# Patient Record
Sex: MALE | Race: WHITE | NOT HISPANIC OR LATINO | Employment: OTHER | URBAN - METROPOLITAN AREA
[De-identification: names, ages, dates, MRNs, and addresses within clinical notes are randomized per-mention and may not be internally consistent; named-entity substitution may affect disease eponyms.]

---

## 2023-08-30 ENCOUNTER — OFFICE VISIT (OUTPATIENT)
Dept: FAMILY MEDICINE CLINIC | Facility: CLINIC | Age: 63
End: 2023-08-30
Payer: COMMERCIAL

## 2023-08-30 VITALS
DIASTOLIC BLOOD PRESSURE: 84 MMHG | BODY MASS INDEX: 34.59 KG/M2 | OXYGEN SATURATION: 95 % | SYSTOLIC BLOOD PRESSURE: 138 MMHG | HEIGHT: 73 IN | TEMPERATURE: 96.4 F | WEIGHT: 261 LBS | HEART RATE: 106 BPM

## 2023-08-30 DIAGNOSIS — G47.33 OSA (OBSTRUCTIVE SLEEP APNEA): ICD-10-CM

## 2023-08-30 DIAGNOSIS — Z87.891 FORMER CIGARETTE SMOKER: ICD-10-CM

## 2023-08-30 DIAGNOSIS — Z11.59 NEED FOR HEPATITIS C SCREENING TEST: ICD-10-CM

## 2023-08-30 DIAGNOSIS — E78.1 HYPERTRIGLYCERIDEMIA: ICD-10-CM

## 2023-08-30 DIAGNOSIS — E11.9 TYPE 2 DIABETES MELLITUS WITHOUT COMPLICATION, WITH LONG-TERM CURRENT USE OF INSULIN: ICD-10-CM

## 2023-08-30 DIAGNOSIS — M25.561 CHRONIC PAIN OF RIGHT KNEE: ICD-10-CM

## 2023-08-30 DIAGNOSIS — E11.9 COMPREHENSIVE DIABETIC FOOT EXAMINATION, TYPE 2 DM, ENCOUNTER FOR: ICD-10-CM

## 2023-08-30 DIAGNOSIS — Z76.89 ENCOUNTER TO ESTABLISH CARE: Primary | ICD-10-CM

## 2023-08-30 DIAGNOSIS — Z79.4 TYPE 2 DIABETES MELLITUS WITHOUT COMPLICATION, WITH LONG-TERM CURRENT USE OF INSULIN: ICD-10-CM

## 2023-08-30 DIAGNOSIS — R35.1 NOCTURIA: ICD-10-CM

## 2023-08-30 DIAGNOSIS — G89.29 CHRONIC PAIN OF RIGHT KNEE: ICD-10-CM

## 2023-08-30 DIAGNOSIS — I10 HTN (HYPERTENSION), BENIGN: ICD-10-CM

## 2023-08-30 DIAGNOSIS — E66.9 CLASS 1 OBESITY WITH SERIOUS COMORBIDITY AND BODY MASS INDEX (BMI) OF 34.0 TO 34.9 IN ADULT, UNSPECIFIED OBESITY TYPE: ICD-10-CM

## 2023-08-30 DIAGNOSIS — G47.09 OTHER INSOMNIA: ICD-10-CM

## 2023-08-30 PROBLEM — H90.3 BILATERAL SENSORINEURAL HEARING LOSS: Status: ACTIVE | Noted: 2022-12-16

## 2023-08-30 RX ORDER — TAMSULOSIN HYDROCHLORIDE 0.4 MG/1
0.4 CAPSULE ORAL DAILY
COMMUNITY
Start: 2023-06-14 | End: 2023-08-30 | Stop reason: SDUPTHER

## 2023-08-30 RX ORDER — BUPROPION HYDROCHLORIDE 150 MG/1
150 TABLET, EXTENDED RELEASE ORAL 2 TIMES DAILY
Qty: 180 TABLET | Refills: 1 | Status: SHIPPED | OUTPATIENT
Start: 2023-08-30

## 2023-08-30 RX ORDER — TRAZODONE HYDROCHLORIDE 100 MG/1
100 TABLET ORAL
Qty: 90 TABLET | Refills: 1 | Status: SHIPPED | OUTPATIENT
Start: 2023-08-30

## 2023-08-30 RX ORDER — INSULIN DEGLUDEC 200 U/ML
130 INJECTION, SOLUTION SUBCUTANEOUS NIGHTLY
Qty: 60 ML | Refills: 1 | Status: SHIPPED | OUTPATIENT
Start: 2023-08-30

## 2023-08-30 RX ORDER — PEN NEEDLE, DIABETIC 32GX 5/32"
NEEDLE, DISPOSABLE MISCELLANEOUS
COMMUNITY
Start: 2023-06-29 | End: 2023-08-30 | Stop reason: SDUPTHER

## 2023-08-30 RX ORDER — AMLODIPINE BESYLATE 5 MG/1
5 TABLET ORAL DAILY
Qty: 90 TABLET | Refills: 1 | Status: SHIPPED | OUTPATIENT
Start: 2023-08-30

## 2023-08-30 RX ORDER — AMLODIPINE BESYLATE 5 MG/1
1 TABLET ORAL DAILY
COMMUNITY
Start: 2023-06-19 | End: 2023-08-30 | Stop reason: SDUPTHER

## 2023-08-30 RX ORDER — BUPROPION HYDROCHLORIDE 150 MG/1
1 TABLET, EXTENDED RELEASE ORAL 2 TIMES DAILY
COMMUNITY
Start: 2023-07-05 | End: 2023-08-30 | Stop reason: SDUPTHER

## 2023-08-30 RX ORDER — PEN NEEDLE, DIABETIC 32GX 5/32"
1 NEEDLE, DISPOSABLE MISCELLANEOUS NIGHTLY
Qty: 100 EACH | Refills: 3 | Status: SHIPPED | OUTPATIENT
Start: 2023-08-30

## 2023-08-30 RX ORDER — INSULIN DEGLUDEC 200 U/ML
INJECTION, SOLUTION SUBCUTANEOUS
COMMUNITY
End: 2023-08-30 | Stop reason: SDUPTHER

## 2023-08-30 RX ORDER — EMPAGLIFLOZIN AND LINAGLIPTIN 25; 5 MG/1; MG/1
1 TABLET, FILM COATED ORAL DAILY
Qty: 90 TABLET | Refills: 1 | Status: SHIPPED | OUTPATIENT
Start: 2023-08-30

## 2023-08-30 RX ORDER — EMPAGLIFLOZIN AND LINAGLIPTIN 25; 5 MG/1; MG/1
1 TABLET, FILM COATED ORAL DAILY
COMMUNITY
Start: 2023-07-07 | End: 2023-08-30 | Stop reason: SDUPTHER

## 2023-08-30 RX ORDER — LISINOPRIL 40 MG/1
40 TABLET ORAL DAILY
COMMUNITY
Start: 2023-07-05 | End: 2023-08-30 | Stop reason: SDUPTHER

## 2023-08-30 RX ORDER — TRAZODONE HYDROCHLORIDE 100 MG/1
100 TABLET ORAL
COMMUNITY
Start: 2023-05-12 | End: 2023-08-30 | Stop reason: SDUPTHER

## 2023-08-30 RX ORDER — FENOFIBRATE 145 MG/1
145 TABLET, COATED ORAL DAILY
Qty: 90 TABLET | Refills: 1 | Status: SHIPPED | OUTPATIENT
Start: 2023-08-30

## 2023-08-30 RX ORDER — IBUPROFEN 600 MG/1
600 TABLET ORAL EVERY 12 HOURS PRN
Qty: 180 TABLET | Refills: 1 | Status: SHIPPED | OUTPATIENT
Start: 2023-08-30

## 2023-08-30 RX ORDER — IBUPROFEN 600 MG/1
1 TABLET ORAL EVERY 8 HOURS
COMMUNITY
Start: 2023-04-11 | End: 2023-08-30 | Stop reason: SDUPTHER

## 2023-08-30 RX ORDER — INSULIN GLARGINE 100 [IU]/ML
INJECTION, SOLUTION SUBCUTANEOUS
COMMUNITY
Start: 2023-06-19 | End: 2023-08-30 | Stop reason: CLARIF

## 2023-08-30 RX ORDER — FENOFIBRATE 145 MG/1
145 TABLET, COATED ORAL DAILY
COMMUNITY
Start: 2023-07-05 | End: 2023-08-30 | Stop reason: SDUPTHER

## 2023-08-30 RX ORDER — TAMSULOSIN HYDROCHLORIDE 0.4 MG/1
0.4 CAPSULE ORAL EVERY EVENING
Qty: 90 CAPSULE | Refills: 1 | Status: SHIPPED | OUTPATIENT
Start: 2023-08-30

## 2023-08-30 RX ORDER — LISINOPRIL 40 MG/1
40 TABLET ORAL DAILY
Qty: 90 TABLET | Refills: 1 | Status: SHIPPED | OUTPATIENT
Start: 2023-08-30

## 2023-08-30 NOTE — PROGRESS NOTES
Chief Complaint  Establish Care (NEW Patient.  Est care )    Subjective            Johnie Salter presents to [facility]  History of Present Illness  Patient is here today to establish care with our practice as a new patient    And then also will need labs and medication refills for the chronic comorbid conditions normally managed from the primary care standpoint and there are records in the system [place] that did his prior chronic comorbid management patient will stop back by fasting    --Diabetes type 2: Tolerating medication well with no side effects no issues reported no overt hypoglycemic episodes and reported as overall stable and his last hemoglobin A1c was in the 6% range    --Hypertension: Tolerates medication well with no side effects no issues reported no chest pain or shortness of breath syncopal episodes dizziness or lightheadedness and overall stable    --Dyslipidemia: Tolerates medication well with no side effects no issues reported no myalgias reported overall stable    --Obstructive sleep apnea on CPAP with insomnia: Tolerating medication very well with no side effects no issues reported no SI/HI and overall stable    --Nocturia: Tolerating the Flomax very well with no side effects no issues reported overall stable    --Chronic pain right knee: Tolerating medication well with no side effects no issues reported and uses a sleeve/brace for the right knee and has been seeing orthopedics as well    PHQ-2 Total Score: 0  PHQ-9 Total Score: 0    Past Medical History:   Diagnosis Date    Cataract     Colon polyp     Diabetes mellitus     HL (hearing loss)     Hyperlipidemia     Hypertension        No Known Allergies     Past Surgical History:   Procedure Laterality Date    CLAVICLE SURGERY Right         Social History     Tobacco Use    Smoking status: Former     Packs/day: 1.00     Years: 45.00     Pack years: 45.00     Types: Cigarettes      Quit date:      Years since quittin.6     Passive exposure: Past    Smokeless tobacco: Never   Vaping Use    Vaping Use: Never used   Substance Use Topics    Alcohol use: Not Currently    Drug use: Not Currently     Types: Marijuana       Family History   Problem Relation Age of Onset    Heart disease Mother     Cancer Mother     Hyperlipidemia Father     Diabetes Father     COPD Father     Hypertension Father     Other Brother         Health Maintenance Due   Topic Date Due    BMI FOLLOWUP  Never done    HEPATITIS C SCREENING  Never done    ANNUAL PHYSICAL  Never done    HEMOGLOBIN A1C  2023    DIABETIC EYE EXAM  Never done        Current Outpatient Medications on File Prior to Visit   Medication Sig    [DISCONTINUED] amLODIPine (NORVASC) 5 MG tablet Take 1 tablet by mouth Daily.    [DISCONTINUED] BD Pen Needle Mony U/F 32G X 4 MM misc USE NIGHTLY AS DIRECTED    [DISCONTINUED] buPROPion SR (WELLBUTRIN SR) 150 MG 12 hr tablet Take 1 tablet by mouth 2 (Two) Times a Day.    [DISCONTINUED] fenofibrate (TRICOR) 145 MG tablet Take 1 tablet by mouth Daily.    [DISCONTINUED] Glyxambi 25-5 MG tablet Take 1 tablet by mouth Daily.    [DISCONTINUED] ibuprofen (ADVIL,MOTRIN) 600 MG tablet Take 1 tablet by mouth Every 8 (Eight) Hours.    [DISCONTINUED] lisinopril (PRINIVIL,ZESTRIL) 40 MG tablet Take 1 tablet by mouth Daily.    [DISCONTINUED] metFORMIN (GLUCOPHAGE) 1000 MG tablet Take 1 tablet by mouth.    [DISCONTINUED] tamsulosin (FLOMAX) 0.4 MG capsule 24 hr capsule Take 1 capsule by mouth Daily.    [DISCONTINUED] traZODone (DESYREL) 100 MG tablet Take 1 tablet by mouth every night at bedtime.    [DISCONTINUED] Tresiba FlexTouch 200 UNIT/ML solution pen-injector pen injection INJECT 130 UNITS BY SUBCUTANEOUS ROUTE NIGHTLY.    [DISCONTINUED] Insulin Glargine (BASAGLAR KWIKPEN) 100 UNIT/ML injection pen INJECT 130 UNITS UNDER THE SKIN EVERY EVENING. E11.65     No current facility-administered medications on file  "prior to visit.       Immunization History   Administered Date(s) Administered    COVID-19 (PFIZER) Purple Cap Monovalent 03/08/2021, 03/29/2021, 01/13/2022    Fluzone >6mos 10/20/2014, 09/10/2020, 10/04/2020, 10/27/2021, 10/26/2022    Influenza Seasonal Injectable 11/06/2016, 10/04/2020    Pneumococcal Conjugate 20-Valent (PCV20) 04/26/2023    Pneumococcal Polysaccharide (PPSV23) 08/13/2016, 10/24/2016    Shingrix 09/10/2019, 01/15/2020    Tdap 04/08/2020       Review of Systems   Constitutional:  Negative for fatigue.   HENT:  Negative for trouble swallowing.    Eyes:  Negative for blurred vision and double vision.   Respiratory:  Negative for choking and shortness of breath.    Cardiovascular:  Negative for chest pain.   Gastrointestinal:  Negative for abdominal pain and blood in stool.   Endocrine: Negative for polydipsia, polyphagia and polyuria.   Genitourinary:  Positive for frequency.   Musculoskeletal:  Positive for arthralgias.        Right knee    Skin:  Negative for rash and wound.   Neurological:  Negative for dizziness, seizures, syncope and light-headedness.   Hematological:  Does not bruise/bleed easily.   Psychiatric/Behavioral: Negative.        Objective     /84 (BP Location: Right arm, Patient Position: Sitting, Cuff Size: Adult)   Pulse 106   Temp 96.4 øF (35.8 øC) (Temporal)   Ht 184.2 cm (72.5\")   Wt 118 kg (261 lb)   SpO2 95%   BMI 34.91 kg/mý       Physical Exam  Vitals and nursing note reviewed.   Constitutional:       Appearance: Normal appearance. He is obese.   HENT:      Head: Normocephalic.      Right Ear: External ear normal.      Left Ear: External ear normal.      Nose: Nose normal.      Mouth/Throat:      Mouth: Mucous membranes are moist.   Eyes:      Pupils: Pupils are equal, round, and reactive to light.      Comments: Glasses    Cardiovascular:      Rate and Rhythm: Normal rate and regular rhythm.      Pulses:           Dorsalis pedis pulses are 2+ on the right side " and 2+ on the left side.        Posterior tibial pulses are 2+ on the right side and 2+ on the left side.      Heart sounds: Normal heart sounds.   Pulmonary:      Effort: Pulmonary effort is normal.      Breath sounds: Normal breath sounds.   Abdominal:      Palpations: Abdomen is soft.      Tenderness: There is no abdominal tenderness.   Musculoskeletal:         General: Normal range of motion.      Cervical back: Normal range of motion and neck supple.      Right foot: No deformity or bunion.      Left foot: No deformity or bunion.        Feet:       Comments: Wearing a knee brace    Feet:      Right foot:      Protective Sensation: 10 sites tested.  7 sites sensed.      Skin integrity: Skin integrity normal. No ulcer or blister.      Toenail Condition: Right toenails are normal. Right toenails are long.      Left foot:      Protective Sensation: 10 sites tested.  7 sites sensed.      Skin integrity: Skin integrity normal. No ulcer or blister.      Toenail Condition: Left toenails are normal. Left toenails are long.      Comments: Diabetic Foot Exam Performed and Monofilament Test Performed--from the line and below (+) good sensation      Skin:     General: Skin is warm and dry.   Neurological:      Mental Status: He is alert and oriented to person, place, and time.   Psychiatric:         Mood and Affect: Mood normal.         Behavior: Behavior normal.         Thought Content: Thought content normal.         Judgment: Judgment normal.       Result Review :                      CT Chest Low Dose Cancer Screening WO (09/04/2022 09:40)  Colonoscopy w Polypectomy (10/17/2022 08:57)  XR Knee 4+ View Right (02/03/2023 09:38)     Assessment and Plan      Diagnoses and all orders for this visit:    1. Encounter to establish care (Primary)    2. Type 2 diabetes mellitus without complication, with long-term current use of insulin  -     Glyxambi 25-5 MG tablet; Take 1 tablet by mouth Daily.  Dispense: 90 tablet; Refill:  1  -     metFORMIN (GLUCOPHAGE) 1000 MG tablet; Take 1 tablet by mouth 2 (Two) Times a Day With Meals.  Dispense: 180 tablet; Refill: 1  -     Tresiba FlexTouch 200 UNIT/ML solution pen-injector pen injection; Inject 130 Units under the skin into the appropriate area as directed Every Night.  Dispense: 60 mL; Refill: 1  -     BD Pen Needle Mony U/F 32G X 4 MM misc; Inject 1 each under the skin into the appropriate area as directed Every Night.  Dispense: 100 each; Refill: 3  -     Urinalysis With Culture If Indicated -  -     CBC & Differential  -     Comprehensive Metabolic Panel  -     Hemoglobin A1c  -     Lipid Panel  -     MicroAlbumin, Urine, Random - Urine, Clean Catch  -     TSH Rfx On Abnormal To Free T4    3. HTN (hypertension), benign  -     amLODIPine (NORVASC) 5 MG tablet; Take 1 tablet by mouth Daily.  Dispense: 90 tablet; Refill: 1  -     lisinopril (PRINIVIL,ZESTRIL) 40 MG tablet; Take 1 tablet by mouth Daily.  Dispense: 90 tablet; Refill: 1  -     Urinalysis With Culture If Indicated -  -     CBC & Differential  -     Comprehensive Metabolic Panel  -     Lipid Panel  -     MicroAlbumin, Urine, Random - Urine, Clean Catch  -     TSH Rfx On Abnormal To Free T4    4. Hypertriglyceridemia  -     fenofibrate (TRICOR) 145 MG tablet; Take 1 tablet by mouth Daily.  Dispense: 90 tablet; Refill: 1  -     Comprehensive Metabolic Panel  -     Lipid Panel    5. Chronic pain of right knee  -     ibuprofen (ADVIL,MOTRIN) 600 MG tablet; Take 1 tablet by mouth Every 12 (Twelve) Hours As Needed for Mild Pain or Moderate Pain.  Dispense: 180 tablet; Refill: 1    6. Nocturia  -     tamsulosin (FLOMAX) 0.4 MG capsule 24 hr capsule; Take 1 capsule by mouth Every Evening.  Dispense: 90 capsule; Refill: 1    7. Other insomnia  -     traZODone (DESYREL) 100 MG tablet; Take 1 tablet by mouth every night at bedtime.  Dispense: 90 tablet; Refill: 1    8. ROSA (obstructive sleep apnea)    9. Former cigarette smoker  -      buPROPion SR (WELLBUTRIN SR) 150 MG 12 hr tablet; Take 1 tablet by mouth 2 (Two) Times a Day.  Dispense: 180 tablet; Refill: 1    10. Comprehensive diabetic foot examination, type 2 DM, encounter for    11. Need for hepatitis C screening test  -     Hepatitis C Antibody    12. Class 1 obesity with serious comorbidity and body mass index (BMI) of 34.0 to 34.9 in adult, unspecified obesity type  Comments:  counseled diet and exercise            Follow Up     Return in about 9 weeks (around 11/1/2023), or if symptoms worsen or fail to improve, for welcome to medicare .    Patient was given instructions and counseling regarding his condition or for health maintenance advice. Please see specific information pulled into the AVS if appropriate.     Class 3 Severe Obesity (BMI >=40). Obesity-related health conditions include the following: hypertension, diabetes mellitus, and dyslipidemias. Obesity is improving with lifestyle modifications. BMI is  counseled diet and exercise  . We discussed portion control and increasing exercise.      Johnie Salter  reports that he quit smoking about 7 months ago. His smoking use included cigarettes. He has a 45.00 pack-year smoking history. He has been exposed to tobacco smoke. He has never used smokeless tobacco.

## 2023-08-31 ENCOUNTER — CLINICAL SUPPORT (OUTPATIENT)
Dept: FAMILY MEDICINE CLINIC | Facility: CLINIC | Age: 63
End: 2023-08-31
Payer: COMMERCIAL

## 2023-08-31 DIAGNOSIS — I10 HTN (HYPERTENSION), BENIGN: ICD-10-CM

## 2023-08-31 DIAGNOSIS — E78.1 HYPERTRIGLYCERIDEMIA: Primary | ICD-10-CM

## 2023-08-31 DIAGNOSIS — E11.9 TYPE 2 DIABETES MELLITUS WITHOUT COMPLICATION, WITH LONG-TERM CURRENT USE OF INSULIN: ICD-10-CM

## 2023-08-31 DIAGNOSIS — Z79.4 TYPE 2 DIABETES MELLITUS WITHOUT COMPLICATION, WITH LONG-TERM CURRENT USE OF INSULIN: ICD-10-CM

## 2023-08-31 LAB
ALBUMIN SERPL-MCNC: 4.6 G/DL (ref 3.5–5.2)
ALBUMIN UR-MCNC: 1.6 MG/DL
ALBUMIN/GLOB SERPL: 1.5 G/DL
ALP SERPL-CCNC: 49 U/L (ref 39–117)
ALT SERPL W P-5'-P-CCNC: 18 U/L (ref 1–41)
ANION GAP SERPL CALCULATED.3IONS-SCNC: 10.3 MMOL/L (ref 5–15)
AST SERPL-CCNC: 19 U/L (ref 1–40)
BASOPHILS # BLD AUTO: 0.03 10*3/MM3 (ref 0–0.2)
BASOPHILS NFR BLD AUTO: 0.3 % (ref 0–1.5)
BILIRUB SERPL-MCNC: 0.3 MG/DL (ref 0–1.2)
BILIRUB UR QL STRIP: NEGATIVE
BUN SERPL-MCNC: 15 MG/DL (ref 8–23)
BUN/CREAT SERPL: 15.3 (ref 7–25)
CALCIUM SPEC-SCNC: 9.6 MG/DL (ref 8.6–10.5)
CHLORIDE SERPL-SCNC: 102 MMOL/L (ref 98–107)
CHOLEST SERPL-MCNC: 147 MG/DL (ref 0–200)
CLARITY UR: CLEAR
CO2 SERPL-SCNC: 25.7 MMOL/L (ref 22–29)
COLOR UR: YELLOW
CREAT SERPL-MCNC: 0.98 MG/DL (ref 0.76–1.27)
DEPRECATED RDW RBC AUTO: 41.4 FL (ref 37–54)
EGFRCR SERPLBLD CKD-EPI 2021: 85.6 ML/MIN/1.73
EOSINOPHIL # BLD AUTO: 0.32 10*3/MM3 (ref 0–0.4)
EOSINOPHIL NFR BLD AUTO: 3.5 % (ref 0.3–6.2)
ERYTHROCYTE [DISTWIDTH] IN BLOOD BY AUTOMATED COUNT: 13.5 % (ref 12.3–15.4)
GLOBULIN UR ELPH-MCNC: 3 GM/DL
GLUCOSE SERPL-MCNC: 74 MG/DL (ref 65–99)
GLUCOSE UR STRIP-MCNC: ABNORMAL MG/DL
HBA1C MFR BLD: 5.7 % (ref 4.8–5.6)
HCT VFR BLD AUTO: 42.9 % (ref 37.5–51)
HCV AB SER DONR QL: NORMAL
HDLC SERPL-MCNC: 38 MG/DL (ref 40–60)
HGB BLD-MCNC: 14.1 G/DL (ref 13–17.7)
HGB UR QL STRIP.AUTO: NEGATIVE
IMM GRANULOCYTES # BLD AUTO: 0.03 10*3/MM3 (ref 0–0.05)
IMM GRANULOCYTES NFR BLD AUTO: 0.3 % (ref 0–0.5)
KETONES UR QL STRIP: NEGATIVE
LDLC SERPL CALC-MCNC: 88 MG/DL (ref 0–100)
LDLC/HDLC SERPL: 2.25 {RATIO}
LEUKOCYTE ESTERASE UR QL STRIP.AUTO: NEGATIVE
LYMPHOCYTES # BLD AUTO: 2.61 10*3/MM3 (ref 0.7–3.1)
LYMPHOCYTES NFR BLD AUTO: 28.6 % (ref 19.6–45.3)
MCH RBC QN AUTO: 27.5 PG (ref 26.6–33)
MCHC RBC AUTO-ENTMCNC: 32.9 G/DL (ref 31.5–35.7)
MCV RBC AUTO: 83.8 FL (ref 79–97)
MONOCYTES # BLD AUTO: 0.72 10*3/MM3 (ref 0.1–0.9)
MONOCYTES NFR BLD AUTO: 7.9 % (ref 5–12)
NEUTROPHILS NFR BLD AUTO: 5.42 10*3/MM3 (ref 1.7–7)
NEUTROPHILS NFR BLD AUTO: 59.4 % (ref 42.7–76)
NITRITE UR QL STRIP: NEGATIVE
NRBC BLD AUTO-RTO: 0 /100 WBC (ref 0–0.2)
PH UR STRIP.AUTO: 7 [PH] (ref 5–8)
PLATELET # BLD AUTO: 359 10*3/MM3 (ref 140–450)
PMV BLD AUTO: 10.6 FL (ref 6–12)
POTASSIUM SERPL-SCNC: 4.5 MMOL/L (ref 3.5–5.2)
PROT SERPL-MCNC: 7.6 G/DL (ref 6–8.5)
PROT UR QL STRIP: NEGATIVE
RBC # BLD AUTO: 5.12 10*6/MM3 (ref 4.14–5.8)
SODIUM SERPL-SCNC: 138 MMOL/L (ref 136–145)
SP GR UR STRIP: 1.02 (ref 1–1.03)
TRIGL SERPL-MCNC: 118 MG/DL (ref 0–150)
TSH SERPL DL<=0.05 MIU/L-ACNC: 0.93 UIU/ML (ref 0.27–4.2)
UROBILINOGEN UR QL STRIP: ABNORMAL
VLDLC SERPL-MCNC: 21 MG/DL (ref 5–40)
WBC NRBC COR # BLD: 9.13 10*3/MM3 (ref 3.4–10.8)

## 2023-08-31 PROCEDURE — 82043 UR ALBUMIN QUANTITATIVE: CPT | Performed by: NURSE PRACTITIONER

## 2023-08-31 PROCEDURE — 83036 HEMOGLOBIN GLYCOSYLATED A1C: CPT | Performed by: NURSE PRACTITIONER

## 2023-08-31 PROCEDURE — 85025 COMPLETE CBC W/AUTO DIFF WBC: CPT | Performed by: NURSE PRACTITIONER

## 2023-08-31 PROCEDURE — 86803 HEPATITIS C AB TEST: CPT | Performed by: NURSE PRACTITIONER

## 2023-08-31 PROCEDURE — 81003 URINALYSIS AUTO W/O SCOPE: CPT | Performed by: NURSE PRACTITIONER

## 2023-08-31 PROCEDURE — 80053 COMPREHEN METABOLIC PANEL: CPT | Performed by: NURSE PRACTITIONER

## 2023-08-31 PROCEDURE — 80061 LIPID PANEL: CPT | Performed by: NURSE PRACTITIONER

## 2023-08-31 PROCEDURE — 84443 ASSAY THYROID STIM HORMONE: CPT | Performed by: NURSE PRACTITIONER

## 2023-08-31 NOTE — PROGRESS NOTES
Please mail letter to patient stating    Johnie the hemoglobin A1c was barely in the prediabetic range at 5.7% as 5.6% or lower is normal and 6.5% or higher is consistent with diabetes; so I would say based on this hemoglobin A1c I would cut back on the Tresiba from 130 units nightly down to approximate 100 to 110 units nightly because I do not want you experiencing any hypoglycemic episodes; and the urinalysis does show 3+ glucose spillage which is of course because of the Glyxambi that you are taking for the diabetes    the cholesterol panel shows everything normal with the exception of the HDL modestly decreased at 38 and should be 40 or higher for men your age; thyroid levels normal range and the comprehensive panel shows normal glucose and normal kidney and liver function test and normal electrolytes; the once in an adult lifetime hepatitis C antibody screening was nonreactive/negative and the urine microalbumin was normal; and the blood counts were normal

## 2023-08-31 NOTE — PROGRESS NOTES
..  Venipuncture Blood Specimen Collection  Venipuncture performed in left arm by Diana Elam with good hemostasis. Patient tolerated the procedure well without complications.   08/31/23   Diana Elam

## 2023-11-01 ENCOUNTER — OFFICE VISIT (OUTPATIENT)
Dept: FAMILY MEDICINE CLINIC | Facility: CLINIC | Age: 63
End: 2023-11-01
Payer: MEDICARE

## 2023-11-01 VITALS
DIASTOLIC BLOOD PRESSURE: 74 MMHG | BODY MASS INDEX: 35.49 KG/M2 | HEART RATE: 98 BPM | OXYGEN SATURATION: 97 % | TEMPERATURE: 97.1 F | WEIGHT: 262 LBS | SYSTOLIC BLOOD PRESSURE: 136 MMHG | HEIGHT: 72 IN

## 2023-11-01 DIAGNOSIS — Z00.00 MEDICARE ANNUAL WELLNESS VISIT, INITIAL: Primary | ICD-10-CM

## 2023-11-01 DIAGNOSIS — Z23 NEED FOR INFLUENZA VACCINATION: ICD-10-CM

## 2023-11-01 DIAGNOSIS — Z12.2 ENCOUNTER FOR SCREENING FOR LUNG CANCER: ICD-10-CM

## 2023-11-01 DIAGNOSIS — Z87.891 PERSONAL HISTORY OF NICOTINE DEPENDENCE: ICD-10-CM

## 2023-11-01 PROBLEM — J43.8 OTHER EMPHYSEMA: Status: ACTIVE | Noted: 2023-11-01

## 2023-11-01 NOTE — PROGRESS NOTES
The ABCs of the Annual Wellness Visit  Initial Medicare Wellness Visit    Subjective     BRANDON Salter II is a 63 y.o. male who presents for an Initial Medicare Wellness Visit.    The following portions of the patient's history were reviewed and   updated as appropriate: allergies, current medications, past family history, past medical history, past social history, past surgical history, and problem list.     Compared to one year ago, the patient feels his physical   health is the same.    Compared to one year ago, the patient feels his mental   health is the same.    Recent Hospitalizations:  He was not admitted to the hospital during the last year.       Current Medical Providers:  Patient Care Team:  Diana Choi APRN as PCP - General (Nurse Practitioner)    Outpatient Medications Prior to Visit   Medication Sig Dispense Refill    amLODIPine (NORVASC) 5 MG tablet Take 1 tablet by mouth Daily. 90 tablet 1    BD Pen Needle Mony U/F 32G X 4 MM misc Inject 1 each under the skin into the appropriate area as directed Every Night. 100 each 3    buPROPion SR (WELLBUTRIN SR) 150 MG 12 hr tablet Take 1 tablet by mouth 2 (Two) Times a Day. 180 tablet 1    fenofibrate (TRICOR) 145 MG tablet Take 1 tablet by mouth Daily. 90 tablet 1    Glyxambi 25-5 MG tablet Take 1 tablet by mouth Daily. 90 tablet 1    ibuprofen (ADVIL,MOTRIN) 600 MG tablet Take 1 tablet by mouth Every 12 (Twelve) Hours As Needed for Mild Pain or Moderate Pain. 180 tablet 1    lisinopril (PRINIVIL,ZESTRIL) 40 MG tablet Take 1 tablet by mouth Daily. 90 tablet 1    metFORMIN (GLUCOPHAGE) 1000 MG tablet Take 1 tablet by mouth 2 (Two) Times a Day With Meals. 180 tablet 1    tamsulosin (FLOMAX) 0.4 MG capsule 24 hr capsule Take 1 capsule by mouth Every Evening. 90 capsule 1    traZODone (DESYREL) 100 MG tablet Take 1 tablet by mouth every night at bedtime. 90 tablet 1    Tresiba FlexTouch 200 UNIT/ML solution pen-injector pen injection Inject 130 Units  "under the skin into the appropriate area as directed Every Night. 60 mL 1     No facility-administered medications prior to visit.       No opioid medication identified on active medication list. I have reviewed chart for other potential  high risk medication/s and harmful drug interactions in the elderly.        Aspirin is not on active medication list.  Aspirin use is not indicated based on review of current medical condition/s. Risk of harm outweighs potential benefits.  .    Patient Active Problem List   Diagnosis    Type 2 diabetes mellitus without complication, with long-term current use of insulin    HTN (hypertension), benign    Hypertriglyceridemia    ROSA (obstructive sleep apnea)    Bilateral sensorineural hearing loss    BMI 36.0-36.9,adult    Other emphysema     Advance Care Planning   Advance Care Planning     Advance Directive is not on file.  ACP discussion was held with the patient during this visit. Patient has an advance directive (not in EMR), copy requested.       Objective    Vitals:    11/01/23 1100   BP: 136/74   BP Location: Right arm   Patient Position: Sitting   Cuff Size: Adult   Pulse: 98   Temp: 97.1 °F (36.2 °C)   TempSrc: Temporal   SpO2: 97%   Weight: 119 kg (262 lb)   Height: 182.9 cm (72\")   PainSc:   4   PainLoc: Knee  Comment: Right     Estimated body mass index is 35.53 kg/m² as calculated from the following:    Height as of this encounter: 182.9 cm (72\").    Weight as of this encounter: 119 kg (262 lb).           Does the patient have evidence of cognitive impairment?   No    Lab Results   Component Value Date    TRIG 118 08/31/2023    HDL 38 (L) 08/31/2023    LDL 88 08/31/2023    VLDL 21 08/31/2023    HGBA1C 5.70 (H) 08/31/2023        HEALTH RISK ASSESSMENT    Smoking Status:  Social History     Tobacco Use   Smoking Status Former    Packs/day: 1.00    Years: 45.00    Additional pack years: 0.00    Total pack years: 45.00    Types: Cigarettes    Quit date: 2023    Years since " quittin.8    Passive exposure: Past   Smokeless Tobacco Never     Alcohol Consumption:  Social History     Substance and Sexual Activity   Alcohol Use Not Currently     Fall Risk Screen:    ALYADI Fall Risk Assessment was completed, and patient is at LOW risk for falls.Assessment completed on:2023    Depression Screen:       2023    11:03 AM   PHQ-2/PHQ-9 Depression Screening   Little Interest or Pleasure in Doing Things 0-->not at all   Feeling Down, Depressed or Hopeless 0-->not at all   PHQ-9: Brief Depression Severity Measure Score 0       Health Habits and Functional and Cognitive Screenin/1/2023    11:01 AM   Functional & Cognitive Status   Do you have difficulty preparing food and eating? No   Do you have difficulty bathing yourself, getting dressed or grooming yourself? No   Do you have difficulty using the toilet? No   Do you have difficulty moving around from place to place? No   Do you have trouble with steps or getting out of a bed or a chair? No   Current Diet Well Balanced Diet   Dental Exam Up to date   Eye Exam Up to date   Exercise (times per week) 7 times per week   Current Exercises Include Yard Work;Walking;Other   Do you need help using the phone?  No   Are you deaf or do you have serious difficulty hearing?  Yes   Do you need help to go to places out of walking distance? No   Do you need help shopping? No   Do you need help preparing meals?  No   Do you need help with housework?  No   Do you need help with laundry? No   Do you need help taking your medications? No   Do you need help managing money? No   Do you ever drive or ride in a car without wearing a seat belt? No   Have you felt unusual stress, anger or loneliness in the last month? No   Who do you live with? Spouse   If you need help, do you have trouble finding someone available to you? No   Have you been bothered in the last four weeks by sexual problems? No   Do you have difficulty concentrating, remembering or  making decisions? No   Vision Screening (11/01/2023)  Edited by: Lacie Jordan   Right eye Left eye Both eyes   With correction 20/20 20/25 20/20       Age-appropriate Screening Schedule:  Refer to the list below for future screening recommendations based on patient's age, sex and/or medical conditions. Orders for these recommended tests are listed in the plan section. The patient has been provided with a written plan.    Health Maintenance   Topic Date Due    DIABETIC EYE EXAM  Never done    LUNG CANCER SCREENING  09/04/2023    AAA SCREEN (ONE-TIME)  11/22/2023 (Originally 8/30/2023)    COVID-19 Vaccine (4 - 2023-24 season) 11/03/2024 (Originally 9/1/2023)    HEMOGLOBIN A1C  02/29/2024    DIABETIC FOOT EXAM  08/30/2024    BMI FOLLOWUP  08/30/2024    LIPID PANEL  08/31/2024    URINE MICROALBUMIN  08/31/2024    ANNUAL WELLNESS VISIT  11/01/2024    TDAP/TD VACCINES (2 - Td or Tdap) 04/08/2030    COLORECTAL CANCER SCREENING  10/17/2032    HEPATITIS C SCREENING  Completed    INFLUENZA VACCINE  Completed    Pneumococcal Vaccine 65+  Completed    ZOSTER VACCINE  Completed          CMS Preventative Services Quick Reference  Risk Factors Identified During Encounter    Immunizations Discussed/Encouraged: Influenza    The above risks/problems have been discussed with the patient.  Pertinent information has been shared with the patient in the After Visit Summary.  An After Visit Summary and PPPS were made available to the patient.  Diagnoses and all orders for this visit:    1. Medicare annual wellness visit, initial (Primary)  -     Fluzone High-Dose 65+yrs  -     ECG 12 Lead    2. Need for influenza vaccination  -     Fluzone High-Dose 65+yrs    3. Encounter for screening for lung cancer  -      CT Chest Low Dose Cancer Screening WO; Future    4. Personal history of nicotine dependence  -      CT Chest Low Dose Cancer Screening WO; Future      Follow Up:  Next Medicare Wellness visit to be scheduled in 1 year.   "      Additional E&M Note during same encounter follows:  Patient has multiple medical problems which are significant and separately identifiable that require additional work above and beyond the Medicare Wellness Visit.      Chief Complaint  Medicare Wellness-Initial Visit    Subjective        Patient is here today for his initial Medicare wellness visit    Also to obtain flu shot updated today    Also he is due for his low-dose CT of the chest screening as he had been getting these done in [place] on a regular basis based on his smoking history      BRANDON Salter II is also being seen today for flu shot and then updated CT chest needed     Review of Systems   Constitutional:  Negative for fatigue.   HENT:  Positive for hearing loss. Negative for trouble swallowing.    Eyes:  Negative for visual disturbance.        When first gets up of the morning or if coming out of a bright light--will see spots for a bit    Respiratory:  Negative for choking and shortness of breath.    Cardiovascular:  Negative for chest pain.   Gastrointestinal:  Negative for abdominal pain.   Endocrine: Negative for polydipsia, polyphagia and polyuria.   Genitourinary:  Positive for frequency. Negative for difficulty urinating.   Musculoskeletal:  Negative for back pain and neck pain.   Neurological:  Negative for dizziness, seizures, syncope and light-headedness.        If looks up for a long period of time--will start to get dizzy and started approx 3 yrs ago and then immediately resolves once looks straight ahead    Psychiatric/Behavioral:  Negative for self-injury and suicidal ideas.        Objective   Vital Signs:  /74 (BP Location: Right arm, Patient Position: Sitting, Cuff Size: Adult)   Pulse 98   Temp 97.1 °F (36.2 °C) (Temporal)   Ht 182.9 cm (72\")   Wt 119 kg (262 lb)   SpO2 97%   BMI 35.53 kg/m²     Physical Exam  Vitals and nursing note reviewed.   Constitutional:       Appearance: Normal appearance.   HENT:      Head: " Normocephalic.      Right Ear: External ear normal.      Left Ear: External ear normal.      Nose: Nose normal.      Mouth/Throat:      Mouth: Mucous membranes are moist.   Eyes:      Pupils: Pupils are equal, round, and reactive to light.   Cardiovascular:      Rate and Rhythm: Normal rate and regular rhythm.      Heart sounds: Normal heart sounds.   Pulmonary:      Effort: Pulmonary effort is normal.      Breath sounds: Normal breath sounds.   Abdominal:      Palpations: Abdomen is soft.   Musculoskeletal:         General: Normal range of motion.      Cervical back: Normal range of motion and neck supple.   Skin:     General: Skin is warm and dry.   Neurological:      Mental Status: He is alert and oriented to person, place, and time.   Psychiatric:         Mood and Affect: Mood normal.         Behavior: Behavior normal.         Thought Content: Thought content normal.         Judgment: Judgment normal.                  ECG 12 Lead    Date/Time: 11/1/2023 11:24 AM  Performed by: Diana Choi APRN    Authorized by: Diana Choi APRN  Comparison: not compared with previous ECG   Previous ECG: no previous ECG available  Rhythm: sinus rhythm  Rate: normal  Conduction: conduction normal  ST Segments: ST segments normal  QRS axis: normal    Clinical impression: normal ECG and non-specific ECG              Assessment and Plan   Diagnoses and all orders for this visit:    1. Medicare annual wellness visit, initial (Primary)  -     Fluzone High-Dose 65+yrs  -     ECG 12 Lead    2. Need for influenza vaccination  -     Fluzone High-Dose 65+yrs    3. Encounter for screening for lung cancer  -      CT Chest Low Dose Cancer Screening WO; Future    4. Personal history of nicotine dependence  -      CT Chest Low Dose Cancer Screening WO; Future             Follow Up   Return if symptoms worsen or fail to improve.  Patient was given instructions and counseling regarding his condition or for health maintenance  advice. Please see specific information pulled into the AVS if appropriate.

## 2023-12-05 ENCOUNTER — HOSPITAL ENCOUNTER (OUTPATIENT)
Dept: CT IMAGING | Facility: HOSPITAL | Age: 63
Discharge: HOME | End: 2023-12-05
Admitting: NURSE PRACTITIONER
Payer: MEDICARE

## 2023-12-05 DIAGNOSIS — Z12.2 ENCOUNTER FOR SCREENING FOR LUNG CANCER: ICD-10-CM

## 2023-12-05 DIAGNOSIS — Z87.891 PERSONAL HISTORY OF NICOTINE DEPENDENCE: ICD-10-CM

## 2023-12-05 PROCEDURE — 71271 CT THORAX LUNG CANCER SCR C-: CPT

## 2023-12-06 NOTE — PROGRESS NOTES
Please mail letter to patient stating    Johnie the low-dose CT scan of the lungs for lung cancer screening shows no suspicious pulmonary nodules and they do recommend continuing annual screening-there were a few subcentimeter mediastinal nodes likely reactive/benign and then minimal emphysema and then also as an incidental there was aortic and mild coronary atherosclerotic disease and the best thing for that is to maintain good blood pressure control good glucose control good cholesterol control and staying active

## 2024-01-29 ENCOUNTER — PATIENT MESSAGE (OUTPATIENT)
Dept: FAMILY MEDICINE CLINIC | Facility: CLINIC | Age: 64
End: 2024-01-29
Payer: MEDICARE

## 2024-01-29 DIAGNOSIS — G47.33 OSA (OBSTRUCTIVE SLEEP APNEA): Primary | ICD-10-CM

## 2024-01-29 DIAGNOSIS — M79.672 PAIN IN BOTH FEET: ICD-10-CM

## 2024-01-29 DIAGNOSIS — M79.671 PAIN IN BOTH FEET: ICD-10-CM

## 2024-01-29 NOTE — TELEPHONE ENCOUNTER
From: BRANDON Salter II  To: Diana Choi  Sent: 1/29/2024 10:59 AM EST  Subject: Foot pain    I mentioned having foot pain to you and declined a referral to a foot Dr. I would like to get that referral now if possible.  In addition I was approved for a cpap by [facility] in [place] in July of 2023. I need to know what to do to get my cpap.

## 2024-01-31 DIAGNOSIS — R35.1 NOCTURIA: ICD-10-CM

## 2024-02-01 ENCOUNTER — OFFICE VISIT (OUTPATIENT)
Dept: PODIATRY | Facility: CLINIC | Age: 64
End: 2024-02-01
Payer: MEDICARE

## 2024-02-01 VITALS
TEMPERATURE: 98.4 F | SYSTOLIC BLOOD PRESSURE: 129 MMHG | HEART RATE: 87 BPM | BODY MASS INDEX: 36.16 KG/M2 | WEIGHT: 267 LBS | DIASTOLIC BLOOD PRESSURE: 69 MMHG | HEIGHT: 72 IN | OXYGEN SATURATION: 96 %

## 2024-02-01 DIAGNOSIS — Q82.8 POROKERATOSIS: ICD-10-CM

## 2024-02-01 DIAGNOSIS — M21.622 TAILOR'S BUNION OF LEFT FOOT: ICD-10-CM

## 2024-02-01 DIAGNOSIS — Z79.4 TYPE 2 DIABETES MELLITUS WITHOUT COMPLICATION, WITH LONG-TERM CURRENT USE OF INSULIN: Primary | ICD-10-CM

## 2024-02-01 DIAGNOSIS — E11.9 TYPE 2 DIABETES MELLITUS WITHOUT COMPLICATION, WITH LONG-TERM CURRENT USE OF INSULIN: Primary | ICD-10-CM

## 2024-02-01 RX ORDER — TAMSULOSIN HYDROCHLORIDE 0.4 MG/1
0.4 CAPSULE ORAL EVERY EVENING
Qty: 90 CAPSULE | Refills: 1 | OUTPATIENT
Start: 2024-02-01

## 2024-02-01 NOTE — PROGRESS NOTES
[facility] - PODIATRY    Today's Date: 24    Patient Name: BRANDON Salter II  MRN: [id number]  CSN: [id number]  PCP: Diana Choi APRN,   Referring Provider: Diana Choi A*    SUBJECTIVE     Chief Complaint   Patient presents with    Left Foot - Establish Care, Annual Exam, Diabetes, Pain     Pain on outside of foot / 5th toe (feel raw) x 6 months  Pain on ball of foot x 2 months   Blood sugar today 170    Right Foot - Establish Care, Annual Exam, Diabetes, Pain     Pain on ball of foot x 2 months      HPI: BRANDON Salter II, a 63 y.o.male, presents to clinic.    Patient 63-year-old male presenting with left foot pain.  Patient states that he has a callus on the outside of his left foot that is thick and painful.  Patient states that he does feel it behind his fourth toe as well when he walks.  Patient states this has been going on for several months.  Patient believes this all started with a tennis shoe that he wore and started the whole process.  His left foot is wider than his right and he is diabetic.    Past Medical History:   Diagnosis Date    Cataract     Colon polyp     Diabetes mellitus     Foot pain, bilateral     HL (hearing loss)     Hyperlipidemia     Hypertension      Past Surgical History:   Procedure Laterality Date    CLAVICLE SURGERY Right      Family History   Problem Relation Age of Onset    Heart disease Mother     Cancer Mother     Hyperlipidemia Father     Diabetes Father     COPD Father     Hypertension Father     Other Brother      Social History     Socioeconomic History    Marital status:    Tobacco Use    Smoking status: Former     Packs/day: 1.00     Years: 45.00     Additional pack years: 0.00     Total pack years: 45.00     Types: Cigarettes     Quit date:      Years since quittin.0     Passive exposure: Past    Smokeless tobacco: Never   Vaping Use    Vaping Use: Never used   Substance and Sexual Activity    Alcohol use: Not Currently     Drug use: Not Currently     Types: Marijuana    Sexual activity: Yes     Partners: Female     No Known Allergies  Current Outpatient Medications   Medication Sig Dispense Refill    amLODIPine (NORVASC) 5 MG tablet Take 1 tablet by mouth Daily. 90 tablet 1    BD Pen Needle Mony U/F 32G X 4 MM misc Inject 1 each under the skin into the appropriate area as directed Every Night. 100 each 3    buPROPion SR (WELLBUTRIN SR) 150 MG 12 hr tablet Take 1 tablet by mouth 2 (Two) Times a Day. 180 tablet 1    fenofibrate (TRICOR) 145 MG tablet Take 1 tablet by mouth Daily. 90 tablet 1    Glyxambi 25-5 MG tablet Take 1 tablet by mouth Daily. 90 tablet 1    ibuprofen (ADVIL,MOTRIN) 600 MG tablet Take 1 tablet by mouth Every 12 (Twelve) Hours As Needed for Mild Pain or Moderate Pain. 180 tablet 1    lisinopril (PRINIVIL,ZESTRIL) 40 MG tablet Take 1 tablet by mouth Daily. 90 tablet 1    metFORMIN (GLUCOPHAGE) 1000 MG tablet Take 1 tablet by mouth 2 (Two) Times a Day With Meals. 180 tablet 1    tamsulosin (FLOMAX) 0.4 MG capsule 24 hr capsule Take 1 capsule by mouth Every Evening. 90 capsule 1    traZODone (DESYREL) 100 MG tablet Take 1 tablet by mouth every night at bedtime. 90 tablet 1    Tresiba FlexTouch 200 UNIT/ML solution pen-injector pen injection Inject 130 Units under the skin into the appropriate area as directed Every Night. 60 mL 1     No current facility-administered medications for this visit.     Review of Systems   All other systems reviewed and are negative.      OBJECTIVE     Vitals:    02/01/24 1032   BP: 129/69   Pulse: 87   Temp: 98.4 °F (36.9 °C)   SpO2: 96%       WBC   Date Value Ref Range Status   08/31/2023 9.13 3.40 - 10.80 10*3/mm3 Final     RBC   Date Value Ref Range Status   08/31/2023 5.12 4.14 - 5.80 10*6/mm3 Final     Hemoglobin   Date Value Ref Range Status   08/31/2023 14.1 13.0 - 17.7 g/dL Final   10/30/2021 13.9 13.0 - 16.5 g/dL Final     Hematocrit   Date Value Ref Range Status   08/31/2023  42.9 37.5 - 51.0 % Final   10/30/2021 43.3 38.0 - 50.0 % Final     MCV   Date Value Ref Range Status   08/31/2023 83.8 79.0 - 97.0 fL Final     MCH   Date Value Ref Range Status   08/31/2023 27.5 26.6 - 33.0 pg Final     MCHC   Date Value Ref Range Status   08/31/2023 32.9 31.5 - 35.7 g/dL Final     RDW   Date Value Ref Range Status   08/31/2023 13.5 12.3 - 15.4 % Final     RDW-SD   Date Value Ref Range Status   08/31/2023 41.4 37.0 - 54.0 fl Final     MPV   Date Value Ref Range Status   08/31/2023 10.6 6.0 - 12.0 fL Final     Platelets   Date Value Ref Range Status   08/31/2023 359 140 - 450 10*3/mm3 Final     Neutrophil %   Date Value Ref Range Status   08/31/2023 59.4 42.7 - 76.0 % Final     Lymphocyte %   Date Value Ref Range Status   08/31/2023 28.6 19.6 - 45.3 % Final     Monocyte %   Date Value Ref Range Status   08/31/2023 7.9 5.0 - 12.0 % Final     Eosinophil %   Date Value Ref Range Status   08/31/2023 3.5 0.3 - 6.2 % Final     Basophil %   Date Value Ref Range Status   08/31/2023 0.3 0.0 - 1.5 % Final     Immature Grans %   Date Value Ref Range Status   08/31/2023 0.3 0.0 - 0.5 % Final     Neutrophils, Absolute   Date Value Ref Range Status   08/31/2023 5.42 1.70 - 7.00 10*3/mm3 Final     Lymphocytes, Absolute   Date Value Ref Range Status   08/31/2023 2.61 0.70 - 3.10 10*3/mm3 Final     Monocytes, Absolute   Date Value Ref Range Status   08/31/2023 0.72 0.10 - 0.90 10*3/mm3 Final     Eosinophils, Absolute   Date Value Ref Range Status   08/31/2023 0.32 0.00 - 0.40 10*3/mm3 Final     Basophils, Absolute   Date Value Ref Range Status   08/31/2023 0.03 0.00 - 0.20 10*3/mm3 Final     Immature Grans, Absolute   Date Value Ref Range Status   08/31/2023 0.03 0.00 - 0.05 10*3/mm3 Final     nRBC   Date Value Ref Range Status   08/31/2023 0.0 0.0 - 0.2 /100 WBC Final         Lab Results   Component Value Date    GLUCOSE 74 08/31/2023    BUN 15 08/31/2023    CREATININE 0.98 08/31/2023    EGFRIFNONA >60 10/21/2020     EGFRIFAFRI >60 10/21/2020    BCR 15.3 08/31/2023    K 4.5 08/31/2023    CO2 25.7 08/31/2023    CALCIUM 9.6 08/31/2023    ALBUMIN 4.6 08/31/2023    LABIL2 1.3 10/21/2020    AST 19 08/31/2023    ALT 18 08/31/2023       Patient seen in no apparent distress.      PHYSICAL EXAM:     Foot/Ankle Exam    GENERAL  Appearance:  appears stated age  Orientation:  AAOx3  Affect:  appropriate  Gait:  unimpaired  Assistance:  independent  Right shoe gear: casual shoe  Left shoe gear: casual shoe    VASCULAR     Right Foot Vascularity   Normal vascular exam    Dorsalis pedis:  2+  Posterior tibial:  2+  Skin temperature:  warm  Edema grading:  None  CFT:  < 3 seconds  Pedal hair growth:  Present  Varicosities:  none     Left Foot Vascularity   Normal vascular exam    Dorsalis pedis:  2+  Posterior tibial:  2+  Skin temperature:  warm  Edema grading:  None  CFT:  < 3 seconds  Pedal hair growth:  Present  Varicosities:  none     NEUROLOGIC     Right Foot Neurologic   Normal sensation    Light touch sensation: normal  Vibratory sensation: normal  Hot/Cold sensation: normal  Protective Sensation using Carmine-Burke Monofilament:   Sites intact: 10  Sites tested: 10     Left Foot Neurologic   Normal sensation    Light touch sensation: normal  Vibratory sensation: normal  Hot/Cold sensation:  normal  Protective Sensation using Carmine-Burke Monofilament:   Sites intact: 10  Sites tested: 10    MUSCULOSKELETAL     Left Foot Musculoskeletal   Tenderness:  MTP 5 dorsal tenderness  Tailor's bunion: Yes      MUSCLE STRENGTH     Right Foot Muscle Strength   Foot dorsiflexion:  4  Foot plantar flexion:  4  Foot inversion:  4  Foot eversion:  4     Left Foot Muscle Strength   Foot dorsiflexion:  4  Foot plantar flexion:  4  Foot inversion:  4  Foot eversion:  4    RANGE OF MOTION     Right Foot Range of Motion   Foot and ankle ROM within normal limits       Left Foot Range of Motion   Foot and ankle ROM within normal limits       DERMATOLOGIC      Right Foot Dermatologic   Skin  Right foot skin is intact.      Left Foot Dermatologic   Skin  Left foot skin is intact.      Left foot additional comments: Large callus to lateral aspect of fifth MPJ with pain on palpation.      RADIOLOGY:      No results found.    ASSESSMENT/PLAN     Diagnoses and all orders for this visit:    1. Type 2 diabetes mellitus without complication, with long-term current use of insulin (Primary)    2. Tailor's bunion of left foot    3. Porokeratosis        Comprehensive lower extremity examination and evaluation was performed.    Hyperkeratotic lesion on left foot.  Partial thickness debridement with #10 scalpel blade down to health tissue.  No signs of edema, erythema, lymphangitis, nor signs of infection.       Discussed surgical options for tailor's bunion, and shoe gear modification    Return to clinic in 2 months    Discussed findings and treatment plan including risks, benefits, and treatment options with patient in detail. Patient agreed with treatment plan.    Medications and allergies reviewed.  Reviewed available lab values along with other pertinent labs.  These were discussed with the patient.    An After Visit Summary was printed and given to the patient at discharge, including (if requested) any available informative/educational handouts regarding diagnosis, treatment, or medications. All questions were answered to patient/family satisfaction. Should symptoms fail to improve or worsen they agree to call or return to clinic or to go to the Emergency Department. Discussed the importance of following up with any needed screening tests/labs/specialist appointments and any requested follow-up recommended by me today. Importance of maintaining follow-up discussed and patient accepts that missed appointments can delay diagnosis and potentially lead to worsening of conditions.    No follow-ups on file., or sooner if acute issues arise.    This document has  been electronically signed by Kuldeep Gonzalez DPM on February 1, 2024 11:34 EST

## 2024-02-08 DIAGNOSIS — R35.1 NOCTURIA: ICD-10-CM

## 2024-02-08 RX ORDER — TAMSULOSIN HYDROCHLORIDE 0.4 MG/1
0.4 CAPSULE ORAL EVERY EVENING
Qty: 90 CAPSULE | Refills: 1 | OUTPATIENT
Start: 2024-02-08

## 2024-02-14 ENCOUNTER — OFFICE VISIT (OUTPATIENT)
Dept: SLEEP MEDICINE | Facility: HOSPITAL | Age: 64
End: 2024-02-14
Payer: MEDICARE

## 2024-02-14 VITALS
BODY MASS INDEX: 36.44 KG/M2 | WEIGHT: 269 LBS | SYSTOLIC BLOOD PRESSURE: 160 MMHG | OXYGEN SATURATION: 97 % | HEART RATE: 92 BPM | HEIGHT: 72 IN | DIASTOLIC BLOOD PRESSURE: 62 MMHG

## 2024-02-14 DIAGNOSIS — G47.33 OSA (OBSTRUCTIVE SLEEP APNEA): Primary | ICD-10-CM

## 2024-02-14 DIAGNOSIS — I10 HTN (HYPERTENSION), BENIGN: ICD-10-CM

## 2024-02-14 PROCEDURE — G0463 HOSPITAL OUTPT CLINIC VISIT: HCPCS

## 2024-02-15 ENCOUNTER — OFFICE VISIT (OUTPATIENT)
Dept: FAMILY MEDICINE CLINIC | Facility: CLINIC | Age: 64
End: 2024-02-15
Payer: MEDICARE

## 2024-02-15 VITALS
SYSTOLIC BLOOD PRESSURE: 138 MMHG | WEIGHT: 270 LBS | HEIGHT: 72 IN | DIASTOLIC BLOOD PRESSURE: 80 MMHG | TEMPERATURE: 97.3 F | HEART RATE: 90 BPM | BODY MASS INDEX: 36.57 KG/M2 | OXYGEN SATURATION: 97 %

## 2024-02-15 DIAGNOSIS — E78.1 HYPERTRIGLYCERIDEMIA: ICD-10-CM

## 2024-02-15 DIAGNOSIS — Z79.4 TYPE 2 DIABETES MELLITUS WITHOUT COMPLICATION, WITH LONG-TERM CURRENT USE OF INSULIN: Primary | ICD-10-CM

## 2024-02-15 DIAGNOSIS — G47.09 OTHER INSOMNIA: ICD-10-CM

## 2024-02-15 DIAGNOSIS — R35.1 NOCTURIA: ICD-10-CM

## 2024-02-15 DIAGNOSIS — Z13.6 ENCOUNTER FOR ABDOMINAL AORTIC ANEURYSM (AAA) SCREENING: ICD-10-CM

## 2024-02-15 DIAGNOSIS — I10 HTN (HYPERTENSION), BENIGN: ICD-10-CM

## 2024-02-15 DIAGNOSIS — Z87.891 FORMER CIGARETTE SMOKER: ICD-10-CM

## 2024-02-15 DIAGNOSIS — E11.9 TYPE 2 DIABETES MELLITUS WITHOUT COMPLICATION, WITH LONG-TERM CURRENT USE OF INSULIN: Primary | ICD-10-CM

## 2024-02-15 RX ORDER — TRAZODONE HYDROCHLORIDE 100 MG/1
100 TABLET ORAL
Qty: 90 TABLET | Refills: 1 | Status: SHIPPED | OUTPATIENT
Start: 2024-02-15

## 2024-02-15 RX ORDER — LISINOPRIL 40 MG/1
40 TABLET ORAL DAILY
Qty: 90 TABLET | Refills: 1 | Status: SHIPPED | OUTPATIENT
Start: 2024-02-15

## 2024-02-15 RX ORDER — INSULIN DEGLUDEC 200 U/ML
130 INJECTION, SOLUTION SUBCUTANEOUS NIGHTLY
Qty: 60 ML | Refills: 1 | Status: SHIPPED | OUTPATIENT
Start: 2024-02-15

## 2024-02-15 RX ORDER — FENOFIBRATE 145 MG/1
145 TABLET, COATED ORAL DAILY
Qty: 90 TABLET | Refills: 1 | Status: SHIPPED | OUTPATIENT
Start: 2024-02-15

## 2024-02-15 RX ORDER — EMPAGLIFLOZIN AND LINAGLIPTIN 25; 5 MG/1; MG/1
1 TABLET, FILM COATED ORAL DAILY
Qty: 90 TABLET | Refills: 1 | Status: SHIPPED | OUTPATIENT
Start: 2024-02-15

## 2024-02-15 RX ORDER — AMLODIPINE BESYLATE 5 MG/1
5 TABLET ORAL DAILY
Qty: 90 TABLET | Refills: 1 | Status: SHIPPED | OUTPATIENT
Start: 2024-02-15

## 2024-02-15 RX ORDER — TAMSULOSIN HYDROCHLORIDE 0.4 MG/1
0.4 CAPSULE ORAL EVERY EVENING
Qty: 90 CAPSULE | Refills: 1 | Status: SHIPPED | OUTPATIENT
Start: 2024-02-15

## 2024-02-15 RX ORDER — BUPROPION HYDROCHLORIDE 150 MG/1
150 TABLET, EXTENDED RELEASE ORAL 2 TIMES DAILY
Qty: 180 TABLET | Refills: 1 | Status: SHIPPED | OUTPATIENT
Start: 2024-02-15

## 2024-02-16 ENCOUNTER — CLINICAL SUPPORT (OUTPATIENT)
Dept: FAMILY MEDICINE CLINIC | Facility: CLINIC | Age: 64
End: 2024-02-16
Payer: MEDICARE

## 2024-02-16 DIAGNOSIS — I10 HTN (HYPERTENSION), BENIGN: Primary | ICD-10-CM

## 2024-02-16 LAB
ALBUMIN SERPL-MCNC: 4.5 G/DL (ref 3.5–5.2)
ALBUMIN/GLOB SERPL: 1.8 G/DL
ALP SERPL-CCNC: 43 U/L (ref 39–117)
ALT SERPL W P-5'-P-CCNC: 16 U/L (ref 1–41)
ANION GAP SERPL CALCULATED.3IONS-SCNC: 11 MMOL/L (ref 5–15)
AST SERPL-CCNC: 12 U/L (ref 1–40)
BASOPHILS # BLD AUTO: 0.07 10*3/MM3 (ref 0–0.2)
BASOPHILS NFR BLD AUTO: 0.7 % (ref 0–1.5)
BILIRUB SERPL-MCNC: 0.2 MG/DL (ref 0–1.2)
BILIRUB UR QL STRIP: NEGATIVE
BUN SERPL-MCNC: 18 MG/DL (ref 8–23)
BUN/CREAT SERPL: 16.1 (ref 7–25)
CALCIUM SPEC-SCNC: 9.5 MG/DL (ref 8.6–10.5)
CHLORIDE SERPL-SCNC: 105 MMOL/L (ref 98–107)
CHOLEST SERPL-MCNC: 160 MG/DL (ref 0–200)
CLARITY UR: CLEAR
CO2 SERPL-SCNC: 22 MMOL/L (ref 22–29)
COLOR UR: YELLOW
CREAT SERPL-MCNC: 1.12 MG/DL (ref 0.76–1.27)
DEPRECATED RDW RBC AUTO: 39.1 FL (ref 37–54)
EGFRCR SERPLBLD CKD-EPI 2021: 72.9 ML/MIN/1.73
EOSINOPHIL # BLD AUTO: 0.62 10*3/MM3 (ref 0–0.4)
EOSINOPHIL NFR BLD AUTO: 6.5 % (ref 0.3–6.2)
ERYTHROCYTE [DISTWIDTH] IN BLOOD BY AUTOMATED COUNT: 13.5 % (ref 12.3–15.4)
GLOBULIN UR ELPH-MCNC: 2.5 GM/DL
GLUCOSE SERPL-MCNC: 111 MG/DL (ref 65–99)
GLUCOSE UR STRIP-MCNC: ABNORMAL MG/DL
HBA1C MFR BLD: 6.8 % (ref 4.8–5.6)
HCT VFR BLD AUTO: 40.3 % (ref 37.5–51)
HDLC SERPL-MCNC: 35 MG/DL (ref 40–60)
HGB BLD-MCNC: 13.4 G/DL (ref 13–17.7)
HGB UR QL STRIP.AUTO: NEGATIVE
HOLD SPECIMEN: NORMAL
IMM GRANULOCYTES # BLD AUTO: 0.05 10*3/MM3 (ref 0–0.05)
IMM GRANULOCYTES NFR BLD AUTO: 0.5 % (ref 0–0.5)
KETONES UR QL STRIP: NEGATIVE
LDLC SERPL CALC-MCNC: 97 MG/DL (ref 0–100)
LDLC/HDLC SERPL: 2.68 {RATIO}
LEUKOCYTE ESTERASE UR QL STRIP.AUTO: NEGATIVE
LYMPHOCYTES # BLD AUTO: 2.65 10*3/MM3 (ref 0.7–3.1)
LYMPHOCYTES NFR BLD AUTO: 27.9 % (ref 19.6–45.3)
MCH RBC QN AUTO: 27.3 PG (ref 26.6–33)
MCHC RBC AUTO-ENTMCNC: 33.3 G/DL (ref 31.5–35.7)
MCV RBC AUTO: 82.1 FL (ref 79–97)
MONOCYTES # BLD AUTO: 0.71 10*3/MM3 (ref 0.1–0.9)
MONOCYTES NFR BLD AUTO: 7.5 % (ref 5–12)
NEUTROPHILS NFR BLD AUTO: 5.39 10*3/MM3 (ref 1.7–7)
NEUTROPHILS NFR BLD AUTO: 56.9 % (ref 42.7–76)
NITRITE UR QL STRIP: NEGATIVE
NRBC BLD AUTO-RTO: 0 /100 WBC (ref 0–0.2)
PH UR STRIP.AUTO: 5.5 [PH] (ref 5–8)
PLATELET # BLD AUTO: 328 10*3/MM3 (ref 140–450)
PMV BLD AUTO: 10.7 FL (ref 6–12)
POTASSIUM SERPL-SCNC: 4.1 MMOL/L (ref 3.5–5.2)
PROT SERPL-MCNC: 7 G/DL (ref 6–8.5)
PROT UR QL STRIP: NEGATIVE
RBC # BLD AUTO: 4.91 10*6/MM3 (ref 4.14–5.8)
SODIUM SERPL-SCNC: 138 MMOL/L (ref 136–145)
SP GR UR STRIP: 1.03 (ref 1–1.03)
TRIGL SERPL-MCNC: 156 MG/DL (ref 0–150)
TSH SERPL DL<=0.05 MIU/L-ACNC: 1.49 UIU/ML (ref 0.27–4.2)
UROBILINOGEN UR QL STRIP: ABNORMAL
VLDLC SERPL-MCNC: 28 MG/DL (ref 5–40)
WBC NRBC COR # BLD AUTO: 9.49 10*3/MM3 (ref 3.4–10.8)

## 2024-02-16 PROCEDURE — 85025 COMPLETE CBC W/AUTO DIFF WBC: CPT | Performed by: NURSE PRACTITIONER

## 2024-02-16 PROCEDURE — 81003 URINALYSIS AUTO W/O SCOPE: CPT | Performed by: NURSE PRACTITIONER

## 2024-02-16 PROCEDURE — 80061 LIPID PANEL: CPT | Performed by: NURSE PRACTITIONER

## 2024-02-16 PROCEDURE — 80053 COMPREHEN METABOLIC PANEL: CPT | Performed by: NURSE PRACTITIONER

## 2024-02-16 PROCEDURE — 84443 ASSAY THYROID STIM HORMONE: CPT | Performed by: NURSE PRACTITIONER

## 2024-02-16 PROCEDURE — 36415 COLL VENOUS BLD VENIPUNCTURE: CPT | Performed by: NURSE PRACTITIONER

## 2024-02-16 PROCEDURE — 83036 HEMOGLOBIN GLYCOSYLATED A1C: CPT | Performed by: NURSE PRACTITIONER

## 2024-02-25 DIAGNOSIS — M25.561 CHRONIC PAIN OF RIGHT KNEE: ICD-10-CM

## 2024-02-25 DIAGNOSIS — G89.29 CHRONIC PAIN OF RIGHT KNEE: ICD-10-CM

## 2024-02-26 RX ORDER — IBUPROFEN 600 MG/1
TABLET ORAL
Qty: 180 TABLET | Refills: 1 | Status: SHIPPED | OUTPATIENT
Start: 2024-02-26

## 2024-03-06 ENCOUNTER — HOSPITAL ENCOUNTER (OUTPATIENT)
Dept: ULTRASOUND IMAGING | Facility: HOSPITAL | Age: 64
Discharge: HOME | End: 2024-03-06
Admitting: NURSE PRACTITIONER
Payer: MEDICARE

## 2024-03-06 ENCOUNTER — TRANSCRIBE ORDERS (OUTPATIENT)
Dept: ADMINISTRATIVE | Facility: HOSPITAL | Age: 64
End: 2024-03-06
Payer: MEDICARE

## 2024-03-06 DIAGNOSIS — M41.9 SCOLIOSIS, UNSPECIFIED SCOLIOSIS TYPE, UNSPECIFIED SPINAL REGION: Primary | ICD-10-CM

## 2024-03-06 DIAGNOSIS — Z13.6 ENCOUNTER FOR ABDOMINAL AORTIC ANEURYSM (AAA) SCREENING: ICD-10-CM

## 2024-03-06 PROCEDURE — 76706 US ABDL AORTA SCREEN AAA: CPT

## 2024-03-06 NOTE — PROGRESS NOTES
Please mail letter to pt stating    Johnie the AAA screening for the abdominal aneurysm was negative/normal

## 2024-03-24 DIAGNOSIS — G47.09 OTHER INSOMNIA: ICD-10-CM

## 2024-03-24 DIAGNOSIS — E11.9 TYPE 2 DIABETES MELLITUS WITHOUT COMPLICATION, WITH LONG-TERM CURRENT USE OF INSULIN: ICD-10-CM

## 2024-03-24 DIAGNOSIS — Z79.4 TYPE 2 DIABETES MELLITUS WITHOUT COMPLICATION, WITH LONG-TERM CURRENT USE OF INSULIN: ICD-10-CM

## 2024-03-24 DIAGNOSIS — I10 HTN (HYPERTENSION), BENIGN: ICD-10-CM

## 2024-03-25 RX ORDER — TRAZODONE HYDROCHLORIDE 100 MG/1
100 TABLET ORAL
Qty: 90 TABLET | Refills: 1 | OUTPATIENT
Start: 2024-03-25

## 2024-03-25 RX ORDER — AMLODIPINE BESYLATE 5 MG/1
5 TABLET ORAL DAILY
Qty: 90 TABLET | Refills: 1 | OUTPATIENT
Start: 2024-03-25

## 2024-04-04 ENCOUNTER — OFFICE VISIT (OUTPATIENT)
Dept: PODIATRY | Facility: CLINIC | Age: 64
End: 2024-04-04
Payer: MEDICARE

## 2024-04-04 VITALS
SYSTOLIC BLOOD PRESSURE: 149 MMHG | DIASTOLIC BLOOD PRESSURE: 81 MMHG | HEART RATE: 82 BPM | BODY MASS INDEX: 36.62 KG/M2 | TEMPERATURE: 98.6 F | OXYGEN SATURATION: 97 % | WEIGHT: 270 LBS

## 2024-04-04 DIAGNOSIS — Z79.4 TYPE 2 DIABETES MELLITUS WITHOUT COMPLICATION, WITH LONG-TERM CURRENT USE OF INSULIN: ICD-10-CM

## 2024-04-04 DIAGNOSIS — E11.9 TYPE 2 DIABETES MELLITUS WITHOUT COMPLICATION, WITH LONG-TERM CURRENT USE OF INSULIN: ICD-10-CM

## 2024-04-04 DIAGNOSIS — M21.622 TAILOR'S BUNION OF LEFT FOOT: Primary | ICD-10-CM

## 2024-04-04 NOTE — PROGRESS NOTES
[facility] - PODIATRY    Today's Date: 24    Patient Name: BRANDON Salter II  MRN: [id number]  CSN: [id number]  PCP: Diana Choi APRN,   Referring Provider: No ref. provider found    SUBJECTIVE     Chief Complaint   Patient presents with    Left Foot - Follow-up, Bunions, Callouses     HPI: BRANDON Salter II, a 64 y.o.male, presents to clinic.    Patient 63-year-old male presenting with left foot pain.  Patient states that he has a callus on the outside of his left foot that is thick and painful.  Patient states that he does feel it behind his fourth toe as well when he walks.  Patient states this has been going on for several months.  Patient believes this all started with a tennis shoe that he wore and started the whole process.  His left foot is wider than his right and he is diabetic.    2024-symptoms resolved since shaving off the callus on the outside of his foot.    Past Medical History:   Diagnosis Date    Cataract     Colon polyp     Diabetes mellitus     Foot pain, bilateral     HL (hearing loss)     Hyperlipidemia     Hypertension      Past Surgical History:   Procedure Laterality Date    CLAVICLE SURGERY Right      Family History   Problem Relation Age of Onset    Heart disease Mother     Cancer Mother     Hyperlipidemia Father     Diabetes Father     COPD Father     Hypertension Father     Sleep apnea Father     Restless legs syndrome Father     Other Brother      Social History     Socioeconomic History    Marital status:    Tobacco Use    Smoking status: Former     Current packs/day: 0.00     Average packs/day: 1 pack/day for 45.0 years (45.0 ttl pk-yrs)     Types: Cigarettes     Start date:      Quit date: 2020     Years since quittin.2     Passive exposure: Past    Smokeless tobacco: Never   Vaping Use    Vaping status: Never Used   Substance and Sexual Activity    Alcohol use: Not Currently    Drug use: Not Currently     Types: Marijuana    Sexual  activity: Yes     Partners: Female     No Known Allergies  Current Outpatient Medications   Medication Sig Dispense Refill    amLODIPine (NORVASC) 5 MG tablet Take 1 tablet by mouth Daily. 90 tablet 1    BD Pen Needle Mony U/F 32G X 4 MM misc Inject 1 each under the skin into the appropriate area as directed Every Night. 100 each 3    buPROPion SR (WELLBUTRIN SR) 150 MG 12 hr tablet Take 1 tablet by mouth 2 (Two) Times a Day. 180 tablet 1    fenofibrate (TRICOR) 145 MG tablet Take 1 tablet by mouth Daily. 90 tablet 1    Glyxambi 25-5 MG tablet Take 1 tablet by mouth Daily. 90 tablet 1    ibuprofen (ADVIL,MOTRIN) 600 MG tablet TAKE 1 TABLET BY MOUTH EVERY 12 HOURS AS NEEDED FOR MILD PAIN OR MODERATE PAIN 180 tablet 1    lisinopril (PRINIVIL,ZESTRIL) 40 MG tablet Take 1 tablet by mouth Daily. 90 tablet 1    metFORMIN (GLUCOPHAGE) 1000 MG tablet Take 1 tablet by mouth 2 (Two) Times a Day With Meals. 180 tablet 1    tamsulosin (FLOMAX) 0.4 MG capsule 24 hr capsule Take 1 capsule by mouth Every Evening. 90 capsule 1    traZODone (DESYREL) 100 MG tablet Take 1 tablet by mouth every night at bedtime. 90 tablet 1    Tresiba FlexTouch 200 UNIT/ML solution pen-injector pen injection Inject 130 Units under the skin into the appropriate area as directed Every Night. 60 mL 1     No current facility-administered medications for this visit.     Review of Systems   All other systems reviewed and are negative.      OBJECTIVE     Vitals:    04/04/24 0923   BP: 149/81   Pulse: 82   Temp: 98.6 °F (37 °C)   SpO2: 97%       WBC   Date Value Ref Range Status   02/16/2024 9.49 3.40 - 10.80 10*3/mm3 Final     RBC   Date Value Ref Range Status   02/16/2024 4.91 4.14 - 5.80 10*6/mm3 Final     Hemoglobin   Date Value Ref Range Status   02/16/2024 13.4 13.0 - 17.7 g/dL Final   10/30/2021 13.9 13.0 - 16.5 g/dL Final     Hematocrit   Date Value Ref Range Status   02/16/2024 40.3 37.5 - 51.0 % Final   10/30/2021 43.3 38.0 - 50.0 % Final     MCV    Date Value Ref Range Status   02/16/2024 82.1 79.0 - 97.0 fL Final     MCH   Date Value Ref Range Status   02/16/2024 27.3 26.6 - 33.0 pg Final     MCHC   Date Value Ref Range Status   02/16/2024 33.3 31.5 - 35.7 g/dL Final     RDW   Date Value Ref Range Status   02/16/2024 13.5 12.3 - 15.4 % Final     RDW-SD   Date Value Ref Range Status   02/16/2024 39.1 37.0 - 54.0 fl Final     MPV   Date Value Ref Range Status   02/16/2024 10.7 6.0 - 12.0 fL Final     Platelets   Date Value Ref Range Status   02/16/2024 328 140 - 450 10*3/mm3 Final     Neutrophil %   Date Value Ref Range Status   02/16/2024 56.9 42.7 - 76.0 % Final     Lymphocyte %   Date Value Ref Range Status   02/16/2024 27.9 19.6 - 45.3 % Final     Monocyte %   Date Value Ref Range Status   02/16/2024 7.5 5.0 - 12.0 % Final     Eosinophil %   Date Value Ref Range Status   02/16/2024 6.5 (H) 0.3 - 6.2 % Final     Basophil %   Date Value Ref Range Status   02/16/2024 0.7 0.0 - 1.5 % Final     Immature Grans %   Date Value Ref Range Status   02/16/2024 0.5 0.0 - 0.5 % Final     Neutrophils, Absolute   Date Value Ref Range Status   02/16/2024 5.39 1.70 - 7.00 10*3/mm3 Final     Lymphocytes, Absolute   Date Value Ref Range Status   02/16/2024 2.65 0.70 - 3.10 10*3/mm3 Final     Monocytes, Absolute   Date Value Ref Range Status   02/16/2024 0.71 0.10 - 0.90 10*3/mm3 Final     Eosinophils, Absolute   Date Value Ref Range Status   02/16/2024 0.62 (H) 0.00 - 0.40 10*3/mm3 Final     Basophils, Absolute   Date Value Ref Range Status   02/16/2024 0.07 0.00 - 0.20 10*3/mm3 Final     Immature Grans, Absolute   Date Value Ref Range Status   02/16/2024 0.05 0.00 - 0.05 10*3/mm3 Final     nRBC   Date Value Ref Range Status   02/16/2024 0.0 0.0 - 0.2 /100 WBC Final         Lab Results   Component Value Date    GLUCOSE 111 (H) 02/16/2024    BUN 18 02/16/2024    CREATININE 1.12 02/16/2024    EGFRIFNONA >60 10/21/2020    EGFRIFAFRI >60 10/21/2020    BCR 16.1 02/16/2024    K  4.1 02/16/2024    CO2 22.0 02/16/2024    CALCIUM 9.5 02/16/2024    ALBUMIN 4.5 02/16/2024    LABIL2 1.3 10/21/2020    AST 12 02/16/2024    ALT 16 02/16/2024       Patient seen in no apparent distress.      PHYSICAL EXAM:     Foot/Ankle Exam    GENERAL  Appearance:  appears stated age  Orientation:  AAOx3  Affect:  appropriate  Gait:  unimpaired  Assistance:  independent  Right shoe gear: casual shoe  Left shoe gear: casual shoe    VASCULAR     Right Foot Vascularity   Normal vascular exam    Dorsalis pedis:  2+  Posterior tibial:  2+  Skin temperature:  warm  Edema grading:  None  CFT:  < 3 seconds  Pedal hair growth:  Present  Varicosities:  none     Left Foot Vascularity   Normal vascular exam    Dorsalis pedis:  2+  Posterior tibial:  2+  Skin temperature:  warm  Edema grading:  None  CFT:  < 3 seconds  Pedal hair growth:  Present  Varicosities:  none     NEUROLOGIC     Right Foot Neurologic   Normal sensation    Light touch sensation: normal  Vibratory sensation: normal  Hot/Cold sensation: normal  Protective Sensation using [place] Monofilament:   Sites intact: 10  Sites tested: 10     Left Foot Neurologic   Normal sensation    Light touch sensation: normal  Vibratory sensation: normal  Hot/Cold sensation:  normal  Protective Sensation using [place] Monofilament:   Sites intact: 10  Sites tested: 10    MUSCULOSKELETAL     Left Foot Musculoskeletal   Tenderness:  MTP 5 dorsal tenderness  Tailor's bunion: Yes      MUSCLE STRENGTH     Right Foot Muscle Strength   Foot dorsiflexion:  4  Foot plantar flexion:  4  Foot inversion:  4  Foot eversion:  4     Left Foot Muscle Strength   Foot dorsiflexion:  4  Foot plantar flexion:  4  Foot inversion:  4  Foot eversion:  4    RANGE OF MOTION     Right Foot Range of Motion   Foot and ankle ROM within normal limits       Left Foot Range of Motion   Foot and ankle ROM within normal limits      DERMATOLOGIC      Right Foot Dermatologic   Skin  Right foot  skin is intact.      Left Foot Dermatologic   Skin  Left foot skin is intact.      Left foot additional comments: Large callus to lateral aspect of fifth MPJ with pain on palpation.      RADIOLOGY:      US aaa screen limited    Result Date: 3/6/2024  Narrative: PROCEDURE: US AAA SCREEN LIMITED  COMPARISON: None  INDICATIONS: screening  TECHNIQUE: Ultrasound examination of the abdominal aorta was performed.   FINDINGS:  AORTA: Normal.  No aneurysm, significant atherosclerosis, or visible occlusion.  Maximal aortic diameter 2.3 centimeter.  Normal caliber common iliac arteries. OTHER: Negative.       Impression:   Negative for abdominal aortic aneurysm.     LINDA SHELTON MD       Electronically Signed and Approved By: LINDA SHELTON MD on 3/06/2024 at 10:24              ASSESSMENT/PLAN     Diagnoses and all orders for this visit:    1. Tailor's bunion of left foot (Primary)    2. Type 2 diabetes mellitus without complication, with long-term current use of insulin        Comprehensive lower extremity examination and evaluation was performed.     Discussed surgical options for tailor's bunion, and shoe gear modification    Return to clinic as needed or if he would like to proceed with surgical correction.    Discussed findings and treatment plan including risks, benefits, and treatment options with patient in detail. Patient agreed with treatment plan.    Medications and allergies reviewed.  Reviewed available lab values along with other pertinent labs.  These were discussed with the patient.    An After Visit Summary was printed and given to the patient at discharge, including (if requested) any available informative/educational handouts regarding diagnosis, treatment, or medications. All questions were answered to patient/family satisfaction. Should symptoms fail to improve or worsen they agree to call or return to clinic or to go to the Emergency Department. Discussed the importance of following up with any needed  screening tests/labs/specialist appointments and any requested follow-up recommended by me today. Importance of maintaining follow-up discussed and patient accepts that missed appointments can delay diagnosis and potentially lead to worsening of conditions.    Return if symptoms worsen or fail to improve., or sooner if acute issues arise.    This document has been electronically signed by Kuldeep Gonzalez DPM on April 4, 2024 09:59 EDT

## 2024-04-11 ENCOUNTER — PATIENT MESSAGE (OUTPATIENT)
Dept: FAMILY MEDICINE CLINIC | Facility: CLINIC | Age: 64
End: 2024-04-11
Payer: MEDICARE

## 2024-04-11 DIAGNOSIS — Z79.4 TYPE 2 DIABETES MELLITUS WITHOUT COMPLICATION, WITH LONG-TERM CURRENT USE OF INSULIN: ICD-10-CM

## 2024-04-11 DIAGNOSIS — E11.9 TYPE 2 DIABETES MELLITUS WITHOUT COMPLICATION, WITH LONG-TERM CURRENT USE OF INSULIN: ICD-10-CM

## 2024-04-11 RX ORDER — DAPAGLIFLOZIN 5 MG/1
5 TABLET, FILM COATED ORAL DAILY
Qty: 90 TABLET | Refills: 1 | Status: SHIPPED | OUTPATIENT
Start: 2024-04-11 | End: 2024-04-11

## 2024-04-11 NOTE — TELEPHONE ENCOUNTER
From: BRANDON Salter II  To: Diana Choi  Sent: 4/11/2024 10:13 AM EDT  Subject: Glyxambi    My glyxambi has gotten really expensive. Is there something else I can take to replace it.

## 2024-04-14 DIAGNOSIS — Z87.891 FORMER CIGARETTE SMOKER: ICD-10-CM

## 2024-04-14 DIAGNOSIS — E11.9 TYPE 2 DIABETES MELLITUS WITHOUT COMPLICATION, WITH LONG-TERM CURRENT USE OF INSULIN: ICD-10-CM

## 2024-04-14 DIAGNOSIS — Z79.4 TYPE 2 DIABETES MELLITUS WITHOUT COMPLICATION, WITH LONG-TERM CURRENT USE OF INSULIN: ICD-10-CM

## 2024-04-15 RX ORDER — BUPROPION HYDROCHLORIDE 150 MG/1
150 TABLET, EXTENDED RELEASE ORAL 2 TIMES DAILY
Qty: 180 TABLET | Refills: 1 | OUTPATIENT
Start: 2024-04-15

## 2024-04-15 RX ORDER — EMPAGLIFLOZIN AND LINAGLIPTIN 25; 5 MG/1; MG/1
1 TABLET, FILM COATED ORAL DAILY
Qty: 90 TABLET | Refills: 1 | OUTPATIENT
Start: 2024-04-15

## 2024-04-17 ENCOUNTER — OFFICE VISIT (OUTPATIENT)
Dept: FAMILY MEDICINE CLINIC | Facility: CLINIC | Age: 64
End: 2024-04-17
Payer: MEDICARE

## 2024-04-17 VITALS
TEMPERATURE: 97.8 F | BODY MASS INDEX: 37.26 KG/M2 | DIASTOLIC BLOOD PRESSURE: 58 MMHG | HEART RATE: 92 BPM | SYSTOLIC BLOOD PRESSURE: 130 MMHG | WEIGHT: 274.7 LBS | OXYGEN SATURATION: 98 %

## 2024-04-17 DIAGNOSIS — Z79.4 TYPE 2 DIABETES MELLITUS WITH HYPERGLYCEMIA, WITH LONG-TERM CURRENT USE OF INSULIN: Primary | ICD-10-CM

## 2024-04-17 DIAGNOSIS — E11.65 TYPE 2 DIABETES MELLITUS WITH HYPERGLYCEMIA, WITH LONG-TERM CURRENT USE OF INSULIN: Primary | ICD-10-CM

## 2024-04-17 DIAGNOSIS — R19.7 DIARRHEA, UNSPECIFIED: ICD-10-CM

## 2024-04-17 DIAGNOSIS — R15.9 ENCOPRESIS: ICD-10-CM

## 2024-04-17 DIAGNOSIS — K52.9 CHRONIC DIARRHEA: ICD-10-CM

## 2024-04-17 PROCEDURE — 1159F MED LIST DOCD IN RCRD: CPT | Performed by: NURSE PRACTITIONER

## 2024-04-17 PROCEDURE — 3078F DIAST BP <80 MM HG: CPT | Performed by: NURSE PRACTITIONER

## 2024-04-17 PROCEDURE — 3044F HG A1C LEVEL LT 7.0%: CPT | Performed by: NURSE PRACTITIONER

## 2024-04-17 PROCEDURE — 3075F SYST BP GE 130 - 139MM HG: CPT | Performed by: NURSE PRACTITIONER

## 2024-04-17 PROCEDURE — 1160F RVW MEDS BY RX/DR IN RCRD: CPT | Performed by: NURSE PRACTITIONER

## 2024-04-17 PROCEDURE — 99213 OFFICE O/P EST LOW 20 MIN: CPT | Performed by: NURSE PRACTITIONER

## 2024-04-17 RX ORDER — EMPAGLIFLOZIN AND LINAGLIPTIN 10; 5 MG/1; MG/1
TABLET, FILM COATED ORAL
COMMUNITY
End: 2024-04-18

## 2024-04-17 NOTE — PROGRESS NOTES
Chief Complaint  GI Problem (About 10 years )    Subjective            BRANDON Salter II presents to [facility]  History of Present Illness  Pt reports GI issues for approx 10 yrs --pt describes them as diarrhea --and pt reports had the norovirus approx 8-10 yrs ago was hospitalized-- and pt reports since having the norovirus-and having the diarrhea multiple times daily--for 8-10 yrs     Recently gotten so bad that he even soiled the bed in his sleep      Last cscope was approx 4-5 yrs ago and was told then F/U in 10 yrs     ___________________________________    And then the patient also reports that he did go ahead and take the greater than $200 for 90 days on the Glyxambi and he would like for me to do the chronic care management referral to see if he can get any type of assistance regarding this      PHQ-2 Total Score:    PHQ-9 Total Score:      Past Medical History:   Diagnosis Date    Cataract     Colon polyp     Diabetes mellitus     Foot pain, bilateral     HL (hearing loss)     Hyperlipidemia     Hypertension        No Known Allergies     Past Surgical History:   Procedure Laterality Date    CLAVICLE SURGERY Right         Social History     Tobacco Use    Smoking status: Former     Current packs/day: 0.00     Average packs/day: 1 pack/day for 45.0 years (45.0 ttl pk-yrs)     Types: Cigarettes     Start date:      Quit date:      Years since quittin.2     Passive exposure: Past    Smokeless tobacco: Never   Vaping Use    Vaping status: Never Used   Substance Use Topics    Alcohol use: Not Currently    Drug use: Not Currently     Types: Marijuana       Family History   Problem Relation Age of Onset    Heart disease Mother     Cancer Mother     Hyperlipidemia Father     Diabetes Father     COPD Father     Hypertension Father     Sleep apnea Father     Restless legs syndrome Father     Other Brother         Health Maintenance Due   Topic Date Due    RSV Vaccine -  Adults (1 - 1-dose 60+ series) Never done        Current Outpatient Medications on File Prior to Visit   Medication Sig    amLODIPine (NORVASC) 5 MG tablet Take 1 tablet by mouth Daily.    BD Pen Needle Mony U/F 32G X 4 MM misc Inject 1 each under the skin into the appropriate area as directed Every Night.    buPROPion SR (WELLBUTRIN SR) 150 MG 12 hr tablet Take 1 tablet by mouth 2 (Two) Times a Day.    fenofibrate (TRICOR) 145 MG tablet Take 1 tablet by mouth Daily.    ibuprofen (ADVIL,MOTRIN) 600 MG tablet TAKE 1 TABLET BY MOUTH EVERY 12 HOURS AS NEEDED FOR MILD PAIN OR MODERATE PAIN    lisinopril (PRINIVIL,ZESTRIL) 40 MG tablet Take 1 tablet by mouth Daily.    metFORMIN (GLUCOPHAGE) 1000 MG tablet Take 1 tablet by mouth 2 (Two) Times a Day With Meals.    tamsulosin (FLOMAX) 0.4 MG capsule 24 hr capsule Take 1 capsule by mouth Every Evening.    traZODone (DESYREL) 100 MG tablet Take 1 tablet by mouth every night at bedtime.    Tresiba FlexTouch 200 UNIT/ML solution pen-injector pen injection Inject 130 Units under the skin into the appropriate area as directed Every Night.    Empagliflozin-linaGLIPtin (Glyxambi) 10-5 MG tablet Take  by mouth.    [DISCONTINUED] empagliflozin (Jardiance) 10 MG tablet tablet Take 1 tablet by mouth Daily.    [DISCONTINUED] linagliptin (TRADJENTA) 5 MG tablet tablet Take 1 tablet by mouth Daily.     No current facility-administered medications on file prior to visit.       Immunization History   Administered Date(s) Administered    COVID-19 (PFIZER) Purple Cap Monovalent 03/08/2021, 03/29/2021, 01/13/2022    Fluzone (or Fluarix & Flulaval for VFC) >6mos 10/20/2014, 09/10/2020, 10/04/2020, 10/27/2021, 10/26/2022    Fluzone High-Dose 65+yrs 11/01/2023    Influenza Seasonal Injectable 11/06/2016, 10/04/2020    Pneumococcal Conjugate 20-Valent (PCV20) 04/26/2023    Pneumococcal Polysaccharide (PPSV23) 08/13/2016, 10/24/2016    Shingrix 09/10/2019, 01/15/2020    Tdap 04/08/2020       Review  of Systems   Constitutional:  Negative for chills, fever, unexpected weight gain and unexpected weight loss.   Gastrointestinal:  Positive for diarrhea. Negative for abdominal pain, blood in stool, nausea and vomiting.   Skin:  Negative for rash.   Neurological:  Negative for dizziness, syncope and light-headedness.        Objective     /58 (BP Location: Left arm, Patient Position: Sitting)   Pulse 92   Temp 97.8 °F (36.6 °C) (Infrared)   Wt 125 kg (274 lb 11.2 oz)   SpO2 98%   BMI 37.26 kg/m²       Physical Exam  Vitals and nursing note reviewed.   Constitutional:       Appearance: Normal appearance.   HENT:      Head: Normocephalic.      Right Ear: External ear normal.      Left Ear: External ear normal.      Nose: Nose normal.      Mouth/Throat:      Mouth: Mucous membranes are moist.   Eyes:      Pupils: Pupils are equal, round, and reactive to light.   Cardiovascular:      Rate and Rhythm: Normal rate and regular rhythm.      Heart sounds: Normal heart sounds.   Pulmonary:      Effort: Pulmonary effort is normal.      Breath sounds: Normal breath sounds.   Abdominal:      General: Bowel sounds are normal.      Palpations: Abdomen is soft.      Tenderness: There is no abdominal tenderness.   Musculoskeletal:         General: Normal range of motion.      Cervical back: Normal range of motion.   Skin:     General: Skin is warm and dry.   Neurological:      Mental Status: He is alert and oriented to person, place, and time.   Psychiatric:         Mood and Affect: Mood normal.         Behavior: Behavior normal.         Thought Content: Thought content normal.         Judgment: Judgment normal.         Result Review :                           Assessment and Plan      Diagnoses and all orders for this visit:    1. Type 2 diabetes mellitus with hyperglycemia, with long-term current use of insulin (Primary)  -     Ambulatory Referral to Chronic Care Management Care Coordination, Medication Adherence    2.  Chronic diarrhea  -     Enteric Bacterial Panel - Stool, Per Rectum  -     H. Pylori Antigen, Stool - Stool, Per Rectum  -     Enteric Parasite Panel - Stool, Per Rectum  -     Clostridioides difficile Toxin, PCR - Stool, Per Rectum    3. Diarrhea, unspecified  -     Enteric Bacterial Panel - Stool, Per Rectum  -     H. Pylori Antigen, Stool - Stool, Per Rectum  -     Enteric Parasite Panel - Stool, Per Rectum  -     Clostridioides difficile Toxin, PCR - Stool, Per Rectum    4. Encopresis  Comments:  at times  Orders:  -     Enteric Bacterial Panel - Stool, Per Rectum  -     H. Pylori Antigen, Stool - Stool, Per Rectum  -     Enteric Parasite Panel - Stool, Per Rectum  -     Clostridioides difficile Toxin, PCR - Stool, Per Rectum    We will get the stool cx's and then see if something needs to be treated --or referral to gastro --I considered the need for CT scan but patient is completely asymptomatic with no nontender abdomen no masses palpable and no complaints of abdominal pain    Referral to chronic care mgt --to see if they are able to assist pt with the cost of the glyxambi dose if 10/5)         Follow Up     Return if symptoms worsen or fail to improve.    Patient was given instructions and counseling regarding his condition or for health maintenance advice. Please see specific information pulled into the AVS if appropriate.            BRANDON Salter II  reports that he quit smoking about 4 years ago. His smoking use included cigarettes. He started smoking about 49 years ago. He has a 45 pack-year smoking history. He has been exposed to tobacco smoke. He has never used smokeless tobacco. I have educated him on the risk of diseases from using tobacco products such as cancer, COPD, and heart disease.

## 2024-04-18 ENCOUNTER — PATIENT MESSAGE (OUTPATIENT)
Dept: FAMILY MEDICINE CLINIC | Facility: CLINIC | Age: 64
End: 2024-04-18
Payer: MEDICARE

## 2024-04-18 ENCOUNTER — CLINICAL SUPPORT (OUTPATIENT)
Dept: FAMILY MEDICINE CLINIC | Facility: CLINIC | Age: 64
End: 2024-04-18
Payer: MEDICARE

## 2024-04-18 ENCOUNTER — REFERRAL TRIAGE (OUTPATIENT)
Dept: CASE MANAGEMENT | Facility: OTHER | Age: 64
End: 2024-04-18
Payer: MEDICARE

## 2024-04-18 DIAGNOSIS — E11.65 TYPE 2 DIABETES MELLITUS WITH HYPERGLYCEMIA, WITH LONG-TERM CURRENT USE OF INSULIN: ICD-10-CM

## 2024-04-18 DIAGNOSIS — Z79.4 TYPE 2 DIABETES MELLITUS WITH HYPERGLYCEMIA, WITH LONG-TERM CURRENT USE OF INSULIN: ICD-10-CM

## 2024-04-18 DIAGNOSIS — I10 HTN (HYPERTENSION), BENIGN: Primary | ICD-10-CM

## 2024-04-18 LAB
027 TOXIN: NORMAL
C DIFF TOX GENS STL QL NAA+PROBE: NEGATIVE
H. PYLORI ANTIGEN STOOL: NEGATIVE

## 2024-04-18 PROCEDURE — 87506 IADNA-DNA/RNA PROBE TQ 6-11: CPT | Performed by: NURSE PRACTITIONER

## 2024-04-18 PROCEDURE — 87493 C DIFF AMPLIFIED PROBE: CPT | Performed by: NURSE PRACTITIONER

## 2024-04-18 PROCEDURE — 87338 HPYLORI STOOL AG IA: CPT | Performed by: NURSE PRACTITIONER

## 2024-04-18 RX ORDER — EMPAGLIFLOZIN AND LINAGLIPTIN 25; 5 MG/1; MG/1
TABLET, FILM COATED ORAL
COMMUNITY

## 2024-04-18 NOTE — TELEPHONE ENCOUNTER
From: BRANDON Salter II  To: Diana Choi  Sent: 4/18/2024 10:48 AM EDT  Subject: Glyxambi    My glyxambi dosage is 25/5 mg not the lower dose that is in my prescription list.

## 2024-04-19 LAB
C COLI+JEJ+UPSA DNA STL QL NAA+NON-PROBE: NOT DETECTED
CRYPTOSP DNA STL QL NAA+NON-PROBE: NOT DETECTED
E HISTOLYT DNA STL QL NAA+NON-PROBE: NOT DETECTED
EC STX1+STX2 GENES STL QL NAA+NON-PROBE: NOT DETECTED
G LAMBLIA DNA STL QL NAA+NON-PROBE: NOT DETECTED
S ENT+BONG DNA STL QL NAA+NON-PROBE: NOT DETECTED
SHIGELLA SP+EIEC IPAH ST NAA+NON-PROBE: NOT DETECTED

## 2024-04-20 NOTE — PROGRESS NOTES
Please mail letter to pt stating    Johnie all the stool tests were completely negative--would you want me to proceed with referral to gastro?

## 2024-04-26 ENCOUNTER — TELEPHONE (OUTPATIENT)
Dept: FAMILY MEDICINE CLINIC | Facility: CLINIC | Age: 64
End: 2024-04-26

## 2024-04-26 DIAGNOSIS — K52.9 CHRONIC DIARRHEA: Primary | ICD-10-CM

## 2024-04-26 NOTE — TELEPHONE ENCOUNTER
"Caller: BRANDON Salter II \"SARY\"    Relationship: Self    Best call back number: [phone]     What is the medical concern/diagnosis:     What specialty or service is being requested: GASTROENTEROLOGIST     Any additional details: PATIENT IS RESPONDING TO [employer] MESSAGE THAT YES HE DOES WANT THE REFERRAL.          "

## 2024-04-30 ENCOUNTER — PATIENT OUTREACH (OUTPATIENT)
Dept: CASE MANAGEMENT | Facility: OTHER | Age: 64
End: 2024-04-30
Payer: MEDICARE

## 2024-04-30 NOTE — OUTREACH NOTE
"AMBULATORY CASE MANAGEMENT NOTE    Names and Relationships of Patient/Support Persons: Caller: BRANDON Salter II \"SARY\"; Relationship: Self -     Patient Outreach    Called and spoke to patient regarding need for assistance with Glyxambi cost. Patient states that he was in the Medicare [place] but he believes he no longer is, so this should be much more affordable now. He is aware that if it is still expensive, he can reach out to myself or PCP office for patient assistance program through [facility].    Patient is interested in generic if that is an option.    Care Coordination    Sent telephone note to PCP to send generic if this is available.    Education Documentation  No documentation found.        LAURENCE DOUGLAS  Ambulatory Case Management    4/30/2024, 15:33 EDT  "

## 2024-05-11 DIAGNOSIS — Z79.4 TYPE 2 DIABETES MELLITUS WITHOUT COMPLICATION, WITH LONG-TERM CURRENT USE OF INSULIN: ICD-10-CM

## 2024-05-11 DIAGNOSIS — E11.9 TYPE 2 DIABETES MELLITUS WITHOUT COMPLICATION, WITH LONG-TERM CURRENT USE OF INSULIN: ICD-10-CM

## 2024-05-12 DIAGNOSIS — E78.1 HYPERTRIGLYCERIDEMIA: ICD-10-CM

## 2024-05-13 RX ORDER — FENOFIBRATE 145 MG/1
145 TABLET, COATED ORAL DAILY
Qty: 90 TABLET | Refills: 0 | Status: SHIPPED | OUTPATIENT
Start: 2024-05-13

## 2024-05-13 RX ORDER — INSULIN DEGLUDEC 200 U/ML
INJECTION, SOLUTION SUBCUTANEOUS
Qty: 60 ML | Refills: 0 | Status: SHIPPED | OUTPATIENT
Start: 2024-05-13

## 2024-05-15 ENCOUNTER — OFFICE VISIT (OUTPATIENT)
Dept: SLEEP MEDICINE | Facility: HOSPITAL | Age: 64
End: 2024-05-15
Payer: MEDICARE

## 2024-05-15 VITALS
BODY MASS INDEX: 37.14 KG/M2 | OXYGEN SATURATION: 99 % | SYSTOLIC BLOOD PRESSURE: 171 MMHG | WEIGHT: 274.2 LBS | HEIGHT: 72 IN | DIASTOLIC BLOOD PRESSURE: 84 MMHG | HEART RATE: 89 BPM

## 2024-05-15 DIAGNOSIS — G47.33 OSA ON CPAP: Primary | ICD-10-CM

## 2024-05-15 PROCEDURE — G0463 HOSPITAL OUTPT CLINIC VISIT: HCPCS

## 2024-05-15 NOTE — PROGRESS NOTES
"  [address] [address]  Phone: [phone]  Fax: [phone]      SLEEP CLINIC FOLLOW UP PROGRESS NOTE.    BRANDON Salter II  [id number]   1960  64 y.o.  male      PCP: Diana Choi APRN      Date of visit: 5/15/2024    Chief Complaint   Patient presents with    Sleep Apnea    Obesity       HPI:  This is a 64 y.o. years old patient is here for the management of obstructive sleep apnea.  Sleep apnea is severe in severity with a AHI of 58/hr. Patient is using positive airway pressure therapy with auto CPAP and the symptoms of sleep apnea have improved significantly on the therapy. Normally patient goes to bed at 10 PM and wakes up at 730 AM .  The patient wakes up 2 time(s) during the night and has no problem going back to sleep.  Feels refreshed after waking up.  He is a retired lives in [facility] previously used to work in [place].    Medications and allergies are reviewed by me and documented in the encounter.     SOCIAL (habits pertaining to sleep medicine)  History tobacco use:No   History of alcohol use: 0 per week  Caffeine use: 4     REVIEW OF SYSTEMS:   Pertaining positive symptoms are:  [place] Sleepiness Scale :Total score: 0       PHYSICAL EXAMINATION:  CONSTITUTIONAL:  Vitals:    05/15/24 0900   BP: 171/84   Pulse: 89   SpO2: 99%   Weight: 124 kg (274 lb 3.2 oz)   Height: 182.9 cm (72.01\")    Body mass index is 37.18 kg/m².   NOSE: nasal passages are clear, No deformities noted   RESP SYSTEM: Not in any respiratory distress, no chest deformities noted,   CARDIOVASULAR: No edema noted  NEURO: Oriented x 3, gait normal,  Mood and affect appeared appropriate      Data reviewed:  The Smart card downloaded on 5/15/2024 has been reviewed independently by me for compliance and discussed the data with the patient.   Compliance; 100%  More than 4 hr use, 97%  Average use of the device 6 hours and 57 minutes per night  Residual AHI: " 2 /hr (Optimal < 5/hr, Good <10/hr, Adequate reduce by 75% from baseline)  Mask type: Fullface mask  Device: ResMed AirSense 11  DME: [employer]      ASSESSMENT AND PLAN:  Obstructive sleep apnea ( G 47.33).  The symptoms of sleep apnea have improved with the device and the treatment.  Patient's compliance with the device is excellent for treatment of sleep apnea.  I have independently reviewed the smart card down load and discussed with the patient the download data and encouarged the patient to continue to use the device.The residual AHI is acceptable. The device is benefiting the patient and the device is medically necessary.  Without proper control of sleep apnea and good compliance there is a increased risk for hypertension, diabetes mellitus and nonrestorative sleep with hypersomnia which can increase risk for motor vehicle accidents.  Untreated sleep apnea is also a risk factor for development of atrial fibrillation, pulmonary hypertension, insulin resistance and stroke. The patient is also instructed to get the supplies from the [employer] company and and change them on a regular basis.  A prescription for supplies has been sent to the [employer] company.  I have also discussed the good sleep hygiene habits and adequate amount of sleep needed for good health.  Obesity  2 with BMI is Body mass index is 37.18 kg/m².. I have discuss the relationship between the weight and sleep apnea. The benefit of weight loss in reducing severity of sleep apnea was discussed. Discussed diet and exercise with the patient to achieve ideal BMI.   Return in about 1 year (around 5/15/2025) for with smart card down load. . Patient's questions were answered.    5/15/2024  Sunny Villafana MD  Sleep Medicine.  Medical Director,   [facility], [facility].

## 2024-07-05 DIAGNOSIS — I10 HTN (HYPERTENSION), BENIGN: ICD-10-CM

## 2024-07-06 RX ORDER — LISINOPRIL 40 MG/1
40 TABLET ORAL DAILY
Qty: 90 TABLET | Refills: 0 | Status: SHIPPED | OUTPATIENT
Start: 2024-07-06

## 2024-07-16 ENCOUNTER — OFFICE VISIT (OUTPATIENT)
Dept: GASTROENTEROLOGY | Facility: CLINIC | Age: 64
End: 2024-07-16
Payer: MEDICARE

## 2024-07-16 VITALS
HEART RATE: 92 BPM | BODY MASS INDEX: 36.53 KG/M2 | OXYGEN SATURATION: 98 % | DIASTOLIC BLOOD PRESSURE: 55 MMHG | WEIGHT: 269.4 LBS | SYSTOLIC BLOOD PRESSURE: 138 MMHG

## 2024-07-16 DIAGNOSIS — K59.1 FUNCTIONAL DIARRHEA: Primary | ICD-10-CM

## 2024-07-16 DIAGNOSIS — R15.9 INCONTINENCE OF FECES, UNSPECIFIED FECAL INCONTINENCE TYPE: ICD-10-CM

## 2024-07-16 RX ORDER — AMOXICILLIN 500 MG/1
CAPSULE ORAL
COMMUNITY
Start: 2024-07-09

## 2024-07-16 RX ORDER — HYDROCODONE BITARTRATE AND ACETAMINOPHEN 5; 325 MG/1; MG/1
TABLET ORAL
COMMUNITY
Start: 2024-07-09

## 2024-07-16 RX ORDER — SACCHAROMYCES BOULARDII 250 MG
250 CAPSULE ORAL 2 TIMES DAILY
Qty: 10 CAPSULE | Refills: 0 | COMMUNITY
Start: 2024-07-16

## 2024-07-16 NOTE — PROGRESS NOTES
Chief Complaint     Diarrhea    History of Present Illness     BRANDON Salter II is a 64 y.o. male who presents to [facility] on referral from TALI Jacques* for a gastroenterology evaluation of diarrhea.     He reports diarrhea that began about 10 years ago when he contracted the noravirus.      States that he will have a solid stool and then experience loose stool behind that.  When this occurs, will have 4-5 bowel movements per day and this lasts for 2-3 days.  He then will not have a bowel movement for a few days until the cycle starts over.  Admits experiencing 2 episodes of nocturnal diarrhea with incontinence recently.  Evaluated by PCP and stool studies ordered and negative.       Denies abdominal pain and rectal bleeding.  Denies any previous treatment for diarrhea.  Reports that he never mentioned it much before to any of his providers.      Previous colonoscopy 2022 with negative random biopsies.         History      Past Medical History:   Diagnosis Date    Cataract     Colon polyp     Diabetes mellitus     Foot pain, bilateral     HL (hearing loss)     Hyperlipidemia     Hypertension        Past Surgical History:   Procedure Laterality Date    CLAVICLE SURGERY Right        Family History   Problem Relation Age of Onset    Heart disease Mother     Cancer Mother     Hyperlipidemia Father     Diabetes Father     COPD Father     Hypertension Father     Sleep apnea Father     Restless legs syndrome Father     Other Brother     Colon cancer Neg Hx         Current Medications        Current Outpatient Medications:     amLODIPine (NORVASC) 5 MG tablet, Take 1 tablet by mouth Daily., Disp: 90 tablet, Rfl: 1    amoxicillin (AMOXIL) 500 MG capsule, TAKE ONE CAPSULE BY MOUTH THREE TIMES DAILY UNTIL GONE, Disp: , Rfl:     BD Pen Needle Mony U/F 32G X 4 MM misc, Inject 1 each under the skin into the appropriate area as directed Every Night., Disp: 100 each, Rfl: 3     buPROPion SR (WELLBUTRIN SR) 150 MG 12 hr tablet, Take 1 tablet by mouth 2 (Two) Times a Day., Disp: 180 tablet, Rfl: 1    Empagliflozin-linaGLIPtin (Glyxambi) 25-5 MG tablet, Take  by mouth., Disp: , Rfl:     fenofibrate (TRICOR) 145 MG tablet, TAKE 1 TABLET BY MOUTH DAILY, Disp: 90 tablet, Rfl: 0    HYDROcodone-acetaminophen (NORCO) 5-325 MG per tablet, , Disp: , Rfl:     lisinopril (PRINIVIL,ZESTRIL) 40 MG tablet, Take 1 tablet by mouth Daily., Disp: 90 tablet, Rfl: 0    metFORMIN (GLUCOPHAGE) 1000 MG tablet, Take 1 tablet by mouth 2 (Two) Times a Day With Meals., Disp: 180 tablet, Rfl: 1    tamsulosin (FLOMAX) 0.4 MG capsule 24 hr capsule, Take 1 capsule by mouth Every Evening., Disp: 90 capsule, Rfl: 1    traZODone (DESYREL) 100 MG tablet, Take 1 tablet by mouth every night at bedtime., Disp: 90 tablet, Rfl: 1    Tresiba FlexTouch 200 UNIT/ML solution pen-injector pen injection, ADMINISTER 130 UNITS UNDER THE SKIN EVERY NIGHT AS DIRECTED, Disp: 60 mL, Rfl: 0     Allergies     No Known Allergies    Social History       Social History     Social History Narrative    Not on file       Immunizations     Immunization:  Immunization History   Administered Date(s) Administered    COVID-19 (PFIZER) Purple Cap Monovalent 03/08/2021, 03/29/2021, 01/13/2022    Fluzone (or Fluarix & Flulaval for VFC) >6mos 10/20/2014, 09/10/2020, 10/04/2020, 10/27/2021, 10/26/2022    Fluzone High-Dose 65+yrs 11/01/2023    Influenza Seasonal Injectable 11/06/2016, 10/04/2020    Pneumococcal Conjugate 20-Valent (PCV20) 04/26/2023    Pneumococcal Polysaccharide (PPSV23) 08/13/2016, 10/24/2016    Shingrix 09/10/2019, 01/15/2020    Tdap 04/08/2020          Objective     Objective     Vital Signs:   /55 (BP Location: Left arm, Patient Position: Sitting, Cuff Size: Adult)   Pulse 92   Wt 122 kg (269 lb 6.4 oz)   SpO2 98%   BMI 36.53 kg/m²       Physical Exam  Constitutional:       General: He is not in acute distress.     Appearance:  Normal appearance. He is well-developed and normal weight.   HENT:      Head: Normocephalic and atraumatic.   Eyes:      Conjunctiva/sclera: Conjunctivae normal.      Pupils: Pupils are equal, round, and reactive to light.      Visual Fields: Right eye visual fields normal and left eye visual fields normal.   Cardiovascular:      Rate and Rhythm: Normal rate.   Pulmonary:      Effort: Pulmonary effort is normal. No respiratory distress or retractions.      Breath sounds: Normal air entry.   Musculoskeletal:         General: Normal range of motion.      Right lower leg: No edema.      Left lower leg: No edema.   Skin:     General: Skin is warm and dry.      Findings: No lesion.   Neurological:      General: No focal deficit present.      Mental Status: He is alert and oriented to person, place, and time.   Psychiatric:         Mood and Affect: Mood and affect normal.         Behavior: Behavior normal.         Results      Result Review :   The following data was reviewed by: CHAZ Dalton on 07/16/2024:    CBC w/diff          8/31/2023    09:41 2/16/2024    08:10   CBC w/Diff   WBC 9.13  9.49    RBC 5.12  4.91    Hemoglobin 14.1  13.4    Hematocrit 42.9  40.3    MCV 83.8  82.1    MCH 27.5  27.3    MCHC 32.9  33.3    RDW 13.5  13.5    Platelets 359  328    Neutrophil Rel % 59.4  56.9    Immature Granulocyte Rel % 0.3  0.5    Lymphocyte Rel % 28.6  27.9    Monocyte Rel % 7.9  7.5    Eosinophil Rel % 3.5  6.5    Basophil Rel % 0.3  0.7      CMP          8/31/2023    09:41 2/16/2024    08:10   CMP   Glucose 74  111    BUN 15  18    Creatinine 0.98  1.12    EGFR 85.6  72.9    Sodium 138  138    Potassium 4.5  4.1    Chloride 102  105    Calcium 9.6  9.5    Total Protein 7.6  7.0    Albumin 4.6  4.5    Globulin 3.0  2.5    Total Bilirubin 0.3  0.2    Alkaline Phosphatase 49  43    AST (SGOT) 19  12    ALT (SGPT) 18  16    Albumin/Globulin Ratio 1.5  1.8    BUN/Creatinine Ratio 15.3  16.1    Anion Gap 10.3  11.0       4/18/2024 C. difficile-negative, enteric parasite panel-negative, H. pylori stool-negative, enteric bacterial panel-negative.    8/15/2022 colonoscopy with biopsy-random colon biopsy-normal.  Transverse colon polyp-hyperplastic, completely removed.    10/17/2022 colonoscopy-normal mucosa noted throughout the colon.  2 colon polyps removed-tubular adenoma and hyperplastic, completely removed..  Hemorrhoids.             Assessment and Plan        Assessment and Plan    Diagnoses and all orders for this visit:    1. Functional diarrhea (Primary)    2. Incontinence of feces, unspecified fecal incontinence type      Recommend that he hold metformin for 2-3 days to determine if diarrhea is associated with dose of metformin.  If diarrhea improves, notify PCP.  If no improvement in symptoms, recommend trial of probiotics.  If no improvement, notify office.    Pt. Verbalized understanding.      I spent 40 minutes caring for BRANDON on this date of service. This time includes time spent by me in the following activities:preparing for the visit, reviewing tests, performing a medically appropriate examination and/or evaluation , counseling and educating the patient/family/caregiver, documenting information in the medical record, and independently interpreting results and communicating that information with the patient/family/caregiver  Follow Up        Follow Up   Return in about 6 months (around 1/16/2025) for Diarrhea.  Patient was given instructions and counseling regarding his condition or for health maintenance advice. Please see specific information pulled into the AVS if appropriate.

## 2024-07-16 NOTE — PATIENT INSTRUCTIONS
Recommend that you hold metformin for 2-3 days to determine if diarrhea is associated with dose of metformin.  If diarrhea improves, notify PCP.  If no improvement in symptoms, recommend trial of probiotics.  If no improvement, notify office.

## 2024-07-26 ENCOUNTER — PATIENT ROUNDING (BHMG ONLY) (OUTPATIENT)
Dept: GASTROENTEROLOGY | Facility: CLINIC | Age: 64
End: 2024-07-26
Payer: MEDICARE

## 2024-07-26 DIAGNOSIS — R35.1 NOCTURIA: ICD-10-CM

## 2024-07-26 NOTE — PROGRESS NOTES
"7/26/2024      Hello, may I speak with BRANDON Salter II     My name is Yajaira. I am calling from [facility]. I show that you had a recent visit with CHAZ Medeiros.    Before we get started may I verify your date of birth? 1960    I am calling to officially welcome you to our practice and ask about your recent visit. Is this a good time to talk? Yes     Tell me about your visit with us. What things went well? \"It all went well, we discussed my issues and Cheryl recommended a few different options and the first one seems to be working well, it was all great\"    We strive to ensure that we protect your safety and privacy. Is there anything we could have done to improve this during your visit?    No     We're always looking for ways to make our patients' experiences even better. Do you have recommendations on ways we may improve?   No     Overall were you satisfied with your first visit to our practice?  Yes     I appreciate you taking the time to speak with me today. Is there anything else I can do for you?  No     I am glad to hear that you had a very good visit and I appreciate you taking the time to provide feedback on this call. We would greatly appreciate you filling out a survey if you receive one in the mail, email or text. This is a great opportunity to provide any additional feedback that you may think of after this call as well.       Thank you, and have a great day.   "

## 2024-07-27 DIAGNOSIS — R35.1 NOCTURIA: ICD-10-CM

## 2024-07-27 DIAGNOSIS — Z79.4 TYPE 2 DIABETES MELLITUS WITHOUT COMPLICATION, WITH LONG-TERM CURRENT USE OF INSULIN: ICD-10-CM

## 2024-07-27 DIAGNOSIS — E11.9 TYPE 2 DIABETES MELLITUS WITHOUT COMPLICATION, WITH LONG-TERM CURRENT USE OF INSULIN: ICD-10-CM

## 2024-07-27 RX ORDER — TAMSULOSIN HYDROCHLORIDE 0.4 MG/1
0.4 CAPSULE ORAL EVERY EVENING
Qty: 90 CAPSULE | Refills: 1 | Status: SHIPPED | OUTPATIENT
Start: 2024-07-27

## 2024-07-29 RX ORDER — PEN NEEDLE, DIABETIC 32GX 5/32"
1 NEEDLE, DISPOSABLE MISCELLANEOUS NIGHTLY
Qty: 100 EACH | Refills: 3 | Status: SHIPPED | OUTPATIENT
Start: 2024-07-29

## 2024-07-29 RX ORDER — TAMSULOSIN HYDROCHLORIDE 0.4 MG/1
0.4 CAPSULE ORAL EVERY EVENING
Qty: 90 CAPSULE | Refills: 1 | OUTPATIENT
Start: 2024-07-29

## 2024-08-15 ENCOUNTER — OFFICE VISIT (OUTPATIENT)
Dept: FAMILY MEDICINE CLINIC | Facility: CLINIC | Age: 64
End: 2024-08-15
Payer: MEDICARE

## 2024-08-15 VITALS
HEART RATE: 89 BPM | DIASTOLIC BLOOD PRESSURE: 84 MMHG | OXYGEN SATURATION: 98 % | SYSTOLIC BLOOD PRESSURE: 134 MMHG | BODY MASS INDEX: 36.44 KG/M2 | HEIGHT: 72 IN | TEMPERATURE: 97.1 F | WEIGHT: 269 LBS

## 2024-08-15 DIAGNOSIS — R35.1 NOCTURIA: ICD-10-CM

## 2024-08-15 DIAGNOSIS — E11.9 TYPE 2 DIABETES MELLITUS WITHOUT COMPLICATION, WITH LONG-TERM CURRENT USE OF INSULIN: Primary | ICD-10-CM

## 2024-08-15 DIAGNOSIS — Z87.891 FORMER CIGARETTE SMOKER: ICD-10-CM

## 2024-08-15 DIAGNOSIS — G47.09 OTHER INSOMNIA: ICD-10-CM

## 2024-08-15 DIAGNOSIS — Z12.5 SCREENING FOR MALIGNANT NEOPLASM OF PROSTATE: ICD-10-CM

## 2024-08-15 DIAGNOSIS — I10 HTN (HYPERTENSION), BENIGN: ICD-10-CM

## 2024-08-15 DIAGNOSIS — Z79.4 TYPE 2 DIABETES MELLITUS WITHOUT COMPLICATION, WITH LONG-TERM CURRENT USE OF INSULIN: Primary | ICD-10-CM

## 2024-08-15 DIAGNOSIS — E78.1 HYPERTRIGLYCERIDEMIA: ICD-10-CM

## 2024-08-15 DIAGNOSIS — E11.9 COMPREHENSIVE DIABETIC FOOT EXAMINATION, TYPE 2 DM, ENCOUNTER FOR: ICD-10-CM

## 2024-08-15 LAB
ALBUMIN SERPL-MCNC: 4.4 G/DL (ref 3.5–5.2)
ALBUMIN UR-MCNC: <1.2 MG/DL
ALBUMIN/GLOB SERPL: 1.4 G/DL
ALP SERPL-CCNC: 53 U/L (ref 39–117)
ALT SERPL W P-5'-P-CCNC: 19 U/L (ref 1–41)
ANION GAP SERPL CALCULATED.3IONS-SCNC: 14 MMOL/L (ref 5–15)
AST SERPL-CCNC: 16 U/L (ref 1–40)
BASOPHILS # BLD AUTO: 0.03 10*3/MM3 (ref 0–0.2)
BASOPHILS NFR BLD AUTO: 0.4 % (ref 0–1.5)
BILIRUB SERPL-MCNC: 0.2 MG/DL (ref 0–1.2)
BILIRUB UR QL STRIP: NEGATIVE
BUN SERPL-MCNC: 17 MG/DL (ref 8–23)
BUN/CREAT SERPL: 17.3 (ref 7–25)
CALCIUM SPEC-SCNC: 9.6 MG/DL (ref 8.6–10.5)
CHLORIDE SERPL-SCNC: 103 MMOL/L (ref 98–107)
CHOLEST SERPL-MCNC: 151 MG/DL (ref 0–200)
CLARITY UR: CLEAR
CO2 SERPL-SCNC: 21 MMOL/L (ref 22–29)
COLOR UR: YELLOW
CREAT SERPL-MCNC: 0.98 MG/DL (ref 0.76–1.27)
DEPRECATED RDW RBC AUTO: 40.8 FL (ref 37–54)
EGFRCR SERPLBLD CKD-EPI 2021: 85 ML/MIN/1.73
EOSINOPHIL # BLD AUTO: 0.22 10*3/MM3 (ref 0–0.4)
EOSINOPHIL NFR BLD AUTO: 2.9 % (ref 0.3–6.2)
ERYTHROCYTE [DISTWIDTH] IN BLOOD BY AUTOMATED COUNT: 13.8 % (ref 12.3–15.4)
GLOBULIN UR ELPH-MCNC: 3.1 GM/DL
GLUCOSE SERPL-MCNC: 139 MG/DL (ref 65–99)
GLUCOSE UR STRIP-MCNC: ABNORMAL MG/DL
HBA1C MFR BLD: 7.1 % (ref 4.8–5.6)
HCT VFR BLD AUTO: 43.2 % (ref 37.5–51)
HDLC SERPL-MCNC: 31 MG/DL (ref 40–60)
HGB BLD-MCNC: 14.1 G/DL (ref 13–17.7)
HGB UR QL STRIP.AUTO: NEGATIVE
HOLD SPECIMEN: NORMAL
IMM GRANULOCYTES # BLD AUTO: 0.02 10*3/MM3 (ref 0–0.05)
IMM GRANULOCYTES NFR BLD AUTO: 0.3 % (ref 0–0.5)
KETONES UR QL STRIP: NEGATIVE
LDLC SERPL CALC-MCNC: 85 MG/DL (ref 0–100)
LDLC/HDLC SERPL: 2.55 {RATIO}
LEUKOCYTE ESTERASE UR QL STRIP.AUTO: NEGATIVE
LYMPHOCYTES # BLD AUTO: 3.11 10*3/MM3 (ref 0.7–3.1)
LYMPHOCYTES NFR BLD AUTO: 40.6 % (ref 19.6–45.3)
MCH RBC QN AUTO: 26.9 PG (ref 26.6–33)
MCHC RBC AUTO-ENTMCNC: 32.6 G/DL (ref 31.5–35.7)
MCV RBC AUTO: 82.4 FL (ref 79–97)
MONOCYTES # BLD AUTO: 0.74 10*3/MM3 (ref 0.1–0.9)
MONOCYTES NFR BLD AUTO: 9.7 % (ref 5–12)
NEUTROPHILS NFR BLD AUTO: 3.54 10*3/MM3 (ref 1.7–7)
NEUTROPHILS NFR BLD AUTO: 46.1 % (ref 42.7–76)
NITRITE UR QL STRIP: NEGATIVE
NRBC BLD AUTO-RTO: 0 /100 WBC (ref 0–0.2)
PH UR STRIP.AUTO: 6 [PH] (ref 5–8)
PLATELET # BLD AUTO: 309 10*3/MM3 (ref 140–450)
PMV BLD AUTO: 10.4 FL (ref 6–12)
POTASSIUM SERPL-SCNC: 4.2 MMOL/L (ref 3.5–5.2)
PROT SERPL-MCNC: 7.5 G/DL (ref 6–8.5)
PROT UR QL STRIP: NEGATIVE
PSA SERPL-MCNC: 0.41 NG/ML (ref 0–4)
RBC # BLD AUTO: 5.24 10*6/MM3 (ref 4.14–5.8)
SODIUM SERPL-SCNC: 138 MMOL/L (ref 136–145)
SP GR UR STRIP: >1.03 (ref 1–1.03)
TRIGL SERPL-MCNC: 204 MG/DL (ref 0–150)
TSH SERPL DL<=0.05 MIU/L-ACNC: 0.53 UIU/ML (ref 0.27–4.2)
UROBILINOGEN UR QL STRIP: ABNORMAL
VLDLC SERPL-MCNC: 35 MG/DL (ref 5–40)
WBC NRBC COR # BLD AUTO: 7.66 10*3/MM3 (ref 3.4–10.8)

## 2024-08-15 PROCEDURE — 82043 UR ALBUMIN QUANTITATIVE: CPT | Performed by: NURSE PRACTITIONER

## 2024-08-15 PROCEDURE — 3044F HG A1C LEVEL LT 7.0%: CPT | Performed by: NURSE PRACTITIONER

## 2024-08-15 PROCEDURE — G0103 PSA SCREENING: HCPCS | Performed by: NURSE PRACTITIONER

## 2024-08-15 PROCEDURE — 3079F DIAST BP 80-89 MM HG: CPT | Performed by: NURSE PRACTITIONER

## 2024-08-15 PROCEDURE — 81003 URINALYSIS AUTO W/O SCOPE: CPT | Performed by: NURSE PRACTITIONER

## 2024-08-15 PROCEDURE — 80053 COMPREHEN METABOLIC PANEL: CPT | Performed by: NURSE PRACTITIONER

## 2024-08-15 PROCEDURE — 80061 LIPID PANEL: CPT | Performed by: NURSE PRACTITIONER

## 2024-08-15 PROCEDURE — 84443 ASSAY THYROID STIM HORMONE: CPT | Performed by: NURSE PRACTITIONER

## 2024-08-15 PROCEDURE — 1159F MED LIST DOCD IN RCRD: CPT | Performed by: NURSE PRACTITIONER

## 2024-08-15 PROCEDURE — 1125F AMNT PAIN NOTED PAIN PRSNT: CPT | Performed by: NURSE PRACTITIONER

## 2024-08-15 PROCEDURE — 99214 OFFICE O/P EST MOD 30 MIN: CPT | Performed by: NURSE PRACTITIONER

## 2024-08-15 PROCEDURE — 3075F SYST BP GE 130 - 139MM HG: CPT | Performed by: NURSE PRACTITIONER

## 2024-08-15 PROCEDURE — 1160F RVW MEDS BY RX/DR IN RCRD: CPT | Performed by: NURSE PRACTITIONER

## 2024-08-15 PROCEDURE — 83036 HEMOGLOBIN GLYCOSYLATED A1C: CPT | Performed by: NURSE PRACTITIONER

## 2024-08-15 PROCEDURE — 36415 COLL VENOUS BLD VENIPUNCTURE: CPT | Performed by: NURSE PRACTITIONER

## 2024-08-15 PROCEDURE — 85025 COMPLETE CBC W/AUTO DIFF WBC: CPT | Performed by: NURSE PRACTITIONER

## 2024-08-15 RX ORDER — FENOFIBRATE 145 MG/1
145 TABLET, COATED ORAL DAILY
Qty: 90 TABLET | Refills: 1 | Status: SHIPPED | OUTPATIENT
Start: 2024-08-15

## 2024-08-15 RX ORDER — BUPROPION HYDROCHLORIDE 150 MG/1
150 TABLET, EXTENDED RELEASE ORAL 2 TIMES DAILY
Qty: 180 TABLET | Refills: 1 | Status: SHIPPED | OUTPATIENT
Start: 2024-08-15

## 2024-08-15 RX ORDER — TRAZODONE HYDROCHLORIDE 100 MG/1
100 TABLET ORAL
Qty: 90 TABLET | Refills: 1 | Status: SHIPPED | OUTPATIENT
Start: 2024-08-15

## 2024-08-15 RX ORDER — INSULIN DEGLUDEC 200 U/ML
130 INJECTION, SOLUTION SUBCUTANEOUS NIGHTLY
Qty: 60 ML | Refills: 1 | Status: SHIPPED | OUTPATIENT
Start: 2024-08-15

## 2024-08-15 RX ORDER — AMLODIPINE BESYLATE 5 MG/1
5 TABLET ORAL DAILY
Qty: 90 TABLET | Refills: 1 | Status: SHIPPED | OUTPATIENT
Start: 2024-08-15

## 2024-08-15 RX ORDER — TAMSULOSIN HYDROCHLORIDE 0.4 MG/1
0.4 CAPSULE ORAL EVERY EVENING
Qty: 90 CAPSULE | Refills: 1 | Status: SHIPPED | OUTPATIENT
Start: 2024-08-15

## 2024-08-15 RX ORDER — EMPAGLIFLOZIN AND LINAGLIPTIN 25; 5 MG/1; MG/1
1 TABLET, FILM COATED ORAL DAILY
Qty: 90 TABLET | Refills: 1 | Status: SHIPPED | OUTPATIENT
Start: 2024-08-15

## 2024-08-15 RX ORDER — LISINOPRIL 40 MG/1
40 TABLET ORAL DAILY
Qty: 90 TABLET | Refills: 1 | Status: SHIPPED | OUTPATIENT
Start: 2024-08-15

## 2024-08-15 NOTE — PROGRESS NOTES
Chief Complaint  Diabetes (Medication refills.)    Subjective            BRANDON Gaetano II presents to [facility]  History of Present Illness  Patient is here today for medication refills on the chronic comorbid conditions managed with primary care standpoint and to obtain his fasting labs    -- Type 2 diabetes insulin-dependent: Tolerating medication well with no side effects no issues reported no hypoglycemic episodes and admits that he does not check his glucoses hardly at all and has been on these dosing regimens of medications for quite some time minus the metformin because when he saw gastroenterology although he did have a history of the norovirus that caused the onset of the diarrhea is stayed persistent for 10 years and she had him to try coming off of the metformin immediate release and the very next day all of the diarrhea symptoms left    --Hypertension: Tolerating medication well without side effects no issues reported no chest pain shortness of breath syncopal episodes dizziness or lightheadedness overall stable    -- Hypertriglyceridemia: Tolerating medication well with no side effects no issues reported no myalgias overall stable    -- Insomnia: Tolerating medication well with no side effects no issues reported no SI/HI and reports that this medication works wonderfully he sleeps well no issues no side effects very stable receiving good efficacy    -- Nocturia: Tolerating the medication well with no side effects no issues reported receives very good efficacy now and only gets up once nightly    -- Former cigarette smoker: Remains on the Zyban and patient reports as long as he stays on the Zyban although he still craves cigarette smoking he does not breakdown and smoke-denies having any side effects no issues no problems with the medication no SI/HI tolerating medication very well and receives good efficacy overall      PHQ-2 Total Score: 0  PHQ-9 Total Score: 0    Past  Medical History:   Diagnosis Date    Cataract     Colon polyp     Diabetes mellitus     Foot pain, bilateral     HL (hearing loss)     Hyperlipidemia     Hypertension        No Known Allergies     Past Surgical History:   Procedure Laterality Date    CLAVICLE SURGERY Right         Social History     Tobacco Use    Smoking status: Former     Current packs/day: 0.00     Average packs/day: 1 pack/day for 45.0 years (45.0 ttl pk-yrs)     Types: Cigarettes     Start date:      Quit date:      Years since quittin.6     Passive exposure: Past    Smokeless tobacco: Never   Vaping Use    Vaping status: Never Used   Substance Use Topics    Alcohol use: Not Currently    Drug use: Not Currently     Types: Marijuana       Family History   Problem Relation Age of Onset    Heart disease Mother     Cancer Mother     Hyperlipidemia Father     Diabetes Father     COPD Father     Hypertension Father     Sleep apnea Father     Restless legs syndrome Father     Other Brother     Colon cancer Neg Hx         Health Maintenance Due   Topic Date Due    INFLUENZA VACCINE  2024    HEMOGLOBIN A1C  2024    ANNUAL WELLNESS VISIT  2024        Current Outpatient Medications on File Prior to Visit   Medication Sig    BD Pen Needle Mony U/F 32G X 4 MM misc Inject 1 each under the skin into the appropriate area as directed Every Night.    HYDROcodone-acetaminophen (NORCO) 5-325 MG per tablet     [DISCONTINUED] amLODIPine (NORVASC) 5 MG tablet Take 1 tablet by mouth Daily.    [DISCONTINUED] buPROPion SR (WELLBUTRIN SR) 150 MG 12 hr tablet Take 1 tablet by mouth 2 (Two) Times a Day.    [DISCONTINUED] Empagliflozin-linaGLIPtin (Glyxambi) 25-5 MG tablet Take  by mouth.    [DISCONTINUED] fenofibrate (TRICOR) 145 MG tablet TAKE 1 TABLET BY MOUTH DAILY    [DISCONTINUED] lisinopril (PRINIVIL,ZESTRIL) 40 MG tablet Take 1 tablet by mouth Daily.    [DISCONTINUED] tamsulosin (FLOMAX) 0.4 MG capsule 24 hr capsule TAKE 1 CAPSULE BY  MOUTH EVERY EVENING    [DISCONTINUED] traZODone (DESYREL) 100 MG tablet Take 1 tablet by mouth every night at bedtime.    [DISCONTINUED] Tresiba FlexTouch 200 UNIT/ML solution pen-injector pen injection ADMINISTER 130 UNITS UNDER THE SKIN EVERY NIGHT AS DIRECTED    [DISCONTINUED] amoxicillin (AMOXIL) 500 MG capsule TAKE ONE CAPSULE BY MOUTH THREE TIMES DAILY UNTIL GONE (Patient not taking: Reported on 8/15/2024)    [DISCONTINUED] metFORMIN (GLUCOPHAGE) 1000 MG tablet Take 1 tablet by mouth 2 (Two) Times a Day With Meals.    [DISCONTINUED] saccharomyces boulardii (Florastor) 250 MG capsule Take 1 capsule by mouth 2 (Two) Times a Day.     No current facility-administered medications on file prior to visit.       Immunization History   Administered Date(s) Administered    COVID-19 (PFIZER) Purple Cap Monovalent 03/08/2021, 03/29/2021, 01/13/2022    Fluzone (or Fluarix & Flulaval for VFC) >6mos 10/20/2014, 09/10/2020, 10/04/2020, 10/27/2021, 10/26/2022    Fluzone High-Dose 65+yrs 11/01/2023    Influenza Seasonal Injectable 11/06/2016, 10/04/2020    Pneumococcal Conjugate 20-Valent (PCV20) 04/26/2023    Pneumococcal Polysaccharide (PPSV23) 08/13/2016, 10/24/2016    Shingrix 09/10/2019, 01/15/2020    Tdap 04/08/2020       Review of Systems   Constitutional:  Negative for fatigue.   HENT:  Negative for trouble swallowing.    Eyes:  Negative for blurred vision and double vision.   Respiratory:  Negative for shortness of breath.    Cardiovascular:  Negative for chest pain.   Gastrointestinal:  Negative for abdominal pain, blood in stool and diarrhea.   Endocrine: Negative for polydipsia, polyphagia and polyuria.   Genitourinary:  Negative for difficulty urinating, dysuria and nocturia.   Musculoskeletal:  Positive for back pain.        Stable chronic    Neurological:  Negative for dizziness, seizures, syncope and light-headedness.   Hematological:  Does not bruise/bleed easily.   Psychiatric/Behavioral:  Positive for sleep  "disturbance. Negative for self-injury, suicidal ideas and depressed mood. The patient is not nervous/anxious.         Does well on the trazodone         Objective     /84   Pulse 89   Temp 97.1 °F (36.2 °C) (Temporal)   Ht 182.9 cm (72\")   Wt 122 kg (269 lb)   SpO2 98%   BMI 36.48 kg/m²       Physical Exam  Vitals and nursing note reviewed.   Constitutional:       Appearance: Normal appearance.   HENT:      Head: Normocephalic.      Right Ear: External ear normal.      Left Ear: External ear normal.      Nose: Nose normal.      Mouth/Throat:      Mouth: Mucous membranes are moist.   Eyes:      Pupils: Pupils are equal, round, and reactive to light.   Cardiovascular:      Rate and Rhythm: Normal rate and regular rhythm.      Pulses:           Dorsalis pedis pulses are 2+ on the right side and 2+ on the left side.        Posterior tibial pulses are 2+ on the right side and 2+ on the left side.      Heart sounds: Normal heart sounds.   Pulmonary:      Effort: Pulmonary effort is normal.      Breath sounds: Normal breath sounds.   Abdominal:      Palpations: Abdomen is soft.      Tenderness: There is no abdominal tenderness.   Musculoskeletal:         General: Normal range of motion.      Cervical back: Normal range of motion and neck supple.      Right foot: No deformity or bunion.      Left foot: No deformity or bunion.   Feet:      Right foot:      Protective Sensation: 10 sites tested.  0 sites sensed.      Skin integrity: Callus present. No ulcer or blister.      Toenail Condition: Right toenails are abnormally thick and long.      Left foot:      Protective Sensation: 10 sites tested.  0 sites sensed.      Skin integrity: Callus present. No ulcer or blister.      Toenail Condition: Left toenails are abnormally thick and long.      Comments: Diabetic Foot Exam Performed and Monofilament Test Performed    No sensation to the bottoms of the feet bilat --and tops have intact sensation      Skin:     " General: Skin is warm and dry.   Neurological:      Mental Status: He is alert and oriented to person, place, and time.   Psychiatric:         Mood and Affect: Mood normal.         Behavior: Behavior normal.         Thought Content: Thought content normal.         Judgment: Judgment normal.         Result Review :                           Assessment and Plan      Diagnoses and all orders for this visit:    1. Type 2 diabetes mellitus without complication, with long-term current use of insulin (Primary)  -     Empagliflozin-linaGLIPtin (Glyxambi) 25-5 MG tablet; Take 1 tablet by mouth Daily.  Dispense: 90 tablet; Refill: 1  -     Tresiba FlexTouch 200 UNIT/ML solution pen-injector pen injection; Inject 130 Units under the skin into the appropriate area as directed Every Night.  Dispense: 60 mL; Refill: 1  -     Urinalysis With Culture If Indicated -  -     CBC & Differential  -     Comprehensive Metabolic Panel  -     Hemoglobin A1c  -     Lipid Panel  -     MicroAlbumin, Urine, Random - Urine, Clean Catch  -     TSH Rfx On Abnormal To Free T4    2. HTN (hypertension), benign  -     amLODIPine (NORVASC) 5 MG tablet; Take 1 tablet by mouth Daily.  Dispense: 90 tablet; Refill: 1  -     lisinopril (PRINIVIL,ZESTRIL) 40 MG tablet; Take 1 tablet by mouth Daily.  Dispense: 90 tablet; Refill: 1  -     Urinalysis With Culture If Indicated -  -     CBC & Differential  -     Comprehensive Metabolic Panel  -     Lipid Panel  -     MicroAlbumin, Urine, Random - Urine, Clean Catch  -     TSH Rfx On Abnormal To Free T4    3. Hypertriglyceridemia  -     fenofibrate (TRICOR) 145 MG tablet; Take 1 tablet by mouth Daily.  Dispense: 90 tablet; Refill: 1  -     Comprehensive Metabolic Panel  -     Lipid Panel    4. Other insomnia  -     traZODone (DESYREL) 100 MG tablet; Take 1 tablet by mouth every night at bedtime.  Dispense: 90 tablet; Refill: 1  -     Comprehensive Metabolic Panel    5. Former cigarette smoker  -     buPROPion SR  (WELLBUTRIN SR) 150 MG 12 hr tablet; Take 1 tablet by mouth 2 (Two) Times a Day.  Dispense: 180 tablet; Refill: 1  -     Comprehensive Metabolic Panel    6. Nocturia  -     tamsulosin (FLOMAX) 0.4 MG capsule 24 hr capsule; Take 1 capsule by mouth Every Evening.  Dispense: 90 capsule; Refill: 1  -     PSA Screen  -     Comprehensive Metabolic Panel  -     MicroAlbumin, Urine, Random - Urine, Clean Catch    7. Screening for malignant neoplasm of prostate  -     PSA Screen    8. Comprehensive diabetic foot examination, type 2 DM, encounter for    Medication refills as noted above and labs as noted above        Follow Up     Return in about 3 months (around 11/15/2024), or if symptoms worsen or fail to improve, for Medicare Wellness.    Patient was given instructions and counseling regarding his condition or for health maintenance advice. Please see specific information pulled into the AVS if appropriate.            BRANDON Salter II  reports that he quit smoking about 4 years ago. His smoking use included cigarettes. He started smoking about 49 years ago. He has a 45 pack-year smoking history. He has been exposed to tobacco smoke. He has never used smokeless tobacco. I have educated him on the risk of diseases from using tobacco products such as cancer, COPD, and heart disease.

## 2024-08-15 NOTE — PROGRESS NOTES
...  Venipuncture Blood Specimen Collection  Venipuncture performed in LT arm by Felecia Echevarria MA with good hemostasis. Patient tolerated the procedure well without complications.   08/15/24   Felecia Echevarria MA

## 2024-08-17 NOTE — PROGRESS NOTES
Please mail letter to patient stating    BRANDON your cholesterol panel shows triglycerides at 204 and it should be less than 150 when fasting and the HDL was 31 and it should be greater than 40 and the LDL was in good range; comprehensive panel shows glucose 139 and normal electrolytes and normal kidney and liver function tests; and your hemoglobin A1c increased again 11 months ago you are at 5.7% 6 months ago you are at 6.8% both of which were well-controlled and now you are at 7.1% and the goal for diabetic is to remain less than 7% please make sure you are taking your all of your diabetic medications appropriately and that you are monitoring your sugar and carbohydrate intake    Your blood counts were normal range and the urine was appropriate as we would expect the glucose spillage because of the medicine that you are taking and the PSA level for prostate cancer screening was in good range and the yearly urine microalbumin was in good range and the thyroid levels were in good range and blood counts in good range

## 2024-08-21 DIAGNOSIS — Z79.4 TYPE 2 DIABETES MELLITUS WITHOUT COMPLICATION, WITH LONG-TERM CURRENT USE OF INSULIN: ICD-10-CM

## 2024-08-21 DIAGNOSIS — E11.9 TYPE 2 DIABETES MELLITUS WITHOUT COMPLICATION, WITH LONG-TERM CURRENT USE OF INSULIN: ICD-10-CM

## 2024-08-22 NOTE — TELEPHONE ENCOUNTER
Refill on the metformin was not appropriate because this caused severe diarrhea for many years and we stopped this

## 2024-10-01 ENCOUNTER — OFFICE VISIT (OUTPATIENT)
Dept: FAMILY MEDICINE CLINIC | Facility: CLINIC | Age: 64
End: 2024-10-01
Payer: MEDICARE

## 2024-10-01 VITALS
OXYGEN SATURATION: 97 % | HEART RATE: 89 BPM | DIASTOLIC BLOOD PRESSURE: 78 MMHG | BODY MASS INDEX: 36.44 KG/M2 | HEIGHT: 72 IN | SYSTOLIC BLOOD PRESSURE: 134 MMHG | WEIGHT: 269 LBS | TEMPERATURE: 97.8 F

## 2024-10-01 DIAGNOSIS — M25.511 ACUTE PAIN OF BOTH SHOULDERS: Primary | ICD-10-CM

## 2024-10-01 DIAGNOSIS — M25.512 ACUTE PAIN OF BOTH SHOULDERS: Primary | ICD-10-CM

## 2024-10-01 PROCEDURE — 99213 OFFICE O/P EST LOW 20 MIN: CPT | Performed by: FAMILY MEDICINE

## 2024-10-01 PROCEDURE — 1125F AMNT PAIN NOTED PAIN PRSNT: CPT | Performed by: FAMILY MEDICINE

## 2024-10-01 PROCEDURE — 3078F DIAST BP <80 MM HG: CPT | Performed by: FAMILY MEDICINE

## 2024-10-01 PROCEDURE — 3051F HG A1C>EQUAL 7.0%<8.0%: CPT | Performed by: FAMILY MEDICINE

## 2024-10-01 PROCEDURE — 3075F SYST BP GE 130 - 139MM HG: CPT | Performed by: FAMILY MEDICINE

## 2024-10-01 RX ORDER — CYCLOBENZAPRINE HCL 10 MG
10 TABLET ORAL NIGHTLY PRN
Qty: 30 TABLET | Refills: 0 | Status: SHIPPED | OUTPATIENT
Start: 2024-10-01

## 2024-10-01 RX ORDER — LIDOCAINE 50 MG/G
1 PATCH TOPICAL EVERY 24 HOURS
Qty: 15 PATCH | Refills: 0 | Status: SHIPPED | OUTPATIENT
Start: 2024-10-01 | End: 2024-10-16

## 2024-10-01 NOTE — PROGRESS NOTES
"Chief Complaint  Back Pain (Fell on 09/27 and since then there has been pain in between shoulder blades/Pt says pain gets worse as the day goes on)    Subjective      BRANDON Salter II is a 64 y.o. male who presents to [facility]     Fell on Friday, 4 days ago, and has had pain since in between his shoulder blades. Was a ground level fall, his feet slipped out from under him and he landed between his shoulder blades. Worst in the morning and gets better throughout the day. If he leans to the right it hurts. Full ROM of both shoulders and neck, but he can feel it in between his shoulder blades when he moves. No numbness or tingling.     Objective   Vital Signs:   Vitals:    10/01/24 1425   BP: 134/78   BP Location: Left arm   Pulse: 89   Temp: 97.8 °F (36.6 °C)   TempSrc: Temporal   SpO2: 97%   Weight: 122 kg (269 lb)   Height: 182.9 cm (72\")   PainSc:   4   PainLoc: Back  Comment: upper     Body mass index is 36.48 kg/m².    Wt Readings from Last 3 Encounters:   10/01/24 122 kg (269 lb)   08/15/24 122 kg (269 lb)   07/16/24 122 kg (269 lb 6.4 oz)     BP Readings from Last 3 Encounters:   10/01/24 134/78   08/15/24 134/84   07/16/24 138/55       Health Maintenance   Topic Date Due    INFLUENZA VACCINE  08/01/2024    COVID-19 Vaccine (4 - 2023-24 season) 09/01/2024    ANNUAL WELLNESS VISIT  11/01/2024    LUNG CANCER SCREENING  12/05/2024    HEMOGLOBIN A1C  02/15/2025    DIABETIC EYE EXAM  03/13/2025    DIABETIC FOOT EXAM  08/15/2025    LIPID PANEL  08/15/2025    URINE MICROALBUMIN  08/15/2025    BMI FOLLOWUP  10/01/2025    TDAP/TD VACCINES (2 - Td or Tdap) 04/08/2030    COLORECTAL CANCER SCREENING  10/17/2032    HEPATITIS C SCREENING  Completed    Pneumococcal Vaccine 65+  Completed    AAA SCREEN (ONE-TIME)  Completed    ZOSTER VACCINE  Completed       Physical Exam  Vitals and nursing note reviewed.   Constitutional:       General: He is not in acute distress.  Cardiovascular:      Rate " and Rhythm: Normal rate.   Pulmonary:      Effort: Pulmonary effort is normal. No respiratory distress.   Musculoskeletal:      Comments: No tenderness of neck or shoulders or back.  He has full range of motion of both shoulders, full range of motion of the neck.  He can reproduce some of the pain with range of motion of the shoulders or neck.   Neurological:      Mental Status: He is alert.   Psychiatric:         Mood and Affect: Mood normal.         Behavior: Behavior normal.          Result Review :  The following data was reviewed by: Bert Lane MD on 10/01/2024:         Procedures          Assessment & Plan  Acute pain of both shoulders  Suspect the pain is muscular in nature.  Given lack of tenderness and full range of motion, I advised that we did not need to get x-rays at this time.    Musculoskeletal pain can be relieved with a variety of things. Over-the-counter Tylenol and NSAIDs (such as ibuprofen) can help. Use heat or ice on the affected area - pick the one that works best for you, don't use both. Topicals such as lidocaine patches or voltaren gel can help. Muscle relaxers (such as tizanidine or cyclobenzaprine) can also help. Muscle relaxers cause drowsiness, so only take them at night. Or, if you take them during the day, do not drive or lift anything heavy while on them. Get plenty of rest and drink plenty of water.     New Medications Ordered This Visit   Medications    cyclobenzaprine (FLEXERIL) 10 MG tablet     Sig: Take 1 tablet by mouth At Night As Needed for Muscle Spasms.     Dispense:  30 tablet     Refill:  0    lidocaine (LIDODERM) 5 %     Sig: Place 1 patch on the skin as directed by provider Daily for 15 days. Remove & Discard patch within 12 hours or as directed by MD     Dispense:  15 patch     Refill:  0               FOLLOW UP  Return if symptoms worsen or fail to improve.  Patient was given instructions and counseling regarding his condition or for health maintenance  advice. Please see specific information pulled into the AVS if appropriate.       Bert Lane MD  10/01/24  14:41 EDT    CURRENT & DISCONTINUED MEDICATIONS  Current Outpatient Medications   Medication Instructions    amLODIPine (NORVASC) 5 mg, Oral, Daily    BD Pen Needle Mony U/F 32G X 4 MM misc 1 each, Subcutaneous, Nightly    buPROPion SR (WELLBUTRIN SR) 150 mg, Oral, 2 Times Daily    cyclobenzaprine (FLEXERIL) 10 mg, Oral, Nightly PRN    Empagliflozin-linaGLIPtin (Glyxambi) 25-5 MG tablet 1 tablet, Oral, Daily    fenofibrate (TRICOR) 145 mg, Oral, Daily    HYDROcodone-acetaminophen (NORCO) 5-325 MG per tablet     lidocaine (LIDODERM) 5 % 1 patch, Transdermal, Every 24 Hours, Remove & Discard patch within 12 hours or as directed by MD    lisinopril (PRINIVIL,ZESTRIL) 40 mg, Oral, Daily    tamsulosin (FLOMAX) 0.4 mg, Oral, Every Evening    traZODone (DESYREL) 100 mg, Oral, Every Night at Bedtime    Tresiba FlexTouch 130 Units, Subcutaneous, Nightly       There are no discontinued medications.

## 2024-10-04 DIAGNOSIS — I10 HTN (HYPERTENSION), BENIGN: ICD-10-CM

## 2024-10-04 DIAGNOSIS — Z79.4 TYPE 2 DIABETES MELLITUS WITHOUT COMPLICATION, WITH LONG-TERM CURRENT USE OF INSULIN: ICD-10-CM

## 2024-10-04 DIAGNOSIS — E11.9 TYPE 2 DIABETES MELLITUS WITHOUT COMPLICATION, WITH LONG-TERM CURRENT USE OF INSULIN: ICD-10-CM

## 2024-10-04 RX ORDER — LISINOPRIL 40 MG/1
40 TABLET ORAL DAILY
Qty: 90 TABLET | Refills: 1 | Status: SHIPPED | OUTPATIENT
Start: 2024-10-04

## 2024-10-04 RX ORDER — EMPAGLIFLOZIN AND LINAGLIPTIN 25; 5 MG/1; MG/1
1 TABLET, FILM COATED ORAL DAILY
Qty: 90 TABLET | Refills: 1 | Status: SHIPPED | OUTPATIENT
Start: 2024-10-04

## 2024-10-23 ENCOUNTER — OFFICE VISIT (OUTPATIENT)
Dept: FAMILY MEDICINE CLINIC | Facility: CLINIC | Age: 64
End: 2024-10-23
Payer: MEDICARE

## 2024-10-23 VITALS
BODY MASS INDEX: 36.35 KG/M2 | TEMPERATURE: 98.1 F | WEIGHT: 268.4 LBS | DIASTOLIC BLOOD PRESSURE: 70 MMHG | SYSTOLIC BLOOD PRESSURE: 149 MMHG | OXYGEN SATURATION: 99 % | HEIGHT: 72 IN | HEART RATE: 90 BPM

## 2024-10-23 DIAGNOSIS — M70.22 OLECRANON BURSITIS OF LEFT ELBOW: Primary | ICD-10-CM

## 2024-10-23 DIAGNOSIS — R35.1 NOCTURIA: ICD-10-CM

## 2024-10-23 DIAGNOSIS — Z23 NEED FOR INFLUENZA VACCINATION: ICD-10-CM

## 2024-10-23 PROCEDURE — 1159F MED LIST DOCD IN RCRD: CPT | Performed by: FAMILY MEDICINE

## 2024-10-23 PROCEDURE — 1160F RVW MEDS BY RX/DR IN RCRD: CPT | Performed by: FAMILY MEDICINE

## 2024-10-23 PROCEDURE — 90662 IIV NO PRSV INCREASED AG IM: CPT | Performed by: FAMILY MEDICINE

## 2024-10-23 PROCEDURE — G0008 ADMIN INFLUENZA VIRUS VAC: HCPCS | Performed by: FAMILY MEDICINE

## 2024-10-23 PROCEDURE — 99213 OFFICE O/P EST LOW 20 MIN: CPT | Performed by: FAMILY MEDICINE

## 2024-10-23 PROCEDURE — 3051F HG A1C>EQUAL 7.0%<8.0%: CPT | Performed by: FAMILY MEDICINE

## 2024-10-23 PROCEDURE — 3078F DIAST BP <80 MM HG: CPT | Performed by: FAMILY MEDICINE

## 2024-10-23 PROCEDURE — 3077F SYST BP >= 140 MM HG: CPT | Performed by: FAMILY MEDICINE

## 2024-10-23 PROCEDURE — 1125F AMNT PAIN NOTED PAIN PRSNT: CPT | Performed by: FAMILY MEDICINE

## 2024-10-23 NOTE — PROGRESS NOTES
"Chief Complaint  Elbow Problem (Left elbow swollen. Pt noticed today)    Subjective      BRANDON Gaetano GARCIA is a 64 y.o. male who presents to [facility]     Left elbow swelling that started today (he just noticed it today, so he's not sure if it's been there longer). It's not hurting him but he's got a lot of swelling in the elbow. No fever, no warmth of the elbow. No pain in the elbow. No redness of the elbow.    Objective   Vital Signs:   Vitals:    10/23/24 1259   BP: 149/70   Pulse: 90   Temp: 98.1 °F (36.7 °C)   SpO2: 99%   Weight: 122 kg (268 lb 6.4 oz)   Height: 181.6 cm (71.5\")     Body mass index is 36.91 kg/m².    Wt Readings from Last 3 Encounters:   10/23/24 122 kg (268 lb 6.4 oz)   10/01/24 122 kg (269 lb)   08/15/24 122 kg (269 lb)     BP Readings from Last 3 Encounters:   10/23/24 149/70   10/01/24 134/78   08/15/24 134/84       Health Maintenance   Topic Date Due    INFLUENZA VACCINE  08/01/2024    ANNUAL WELLNESS VISIT  11/01/2024    LUNG CANCER SCREENING  12/05/2024    COVID-19 Vaccine (4 - 2023-24 season) 10/25/2024 (Originally 9/1/2024)    HEMOGLOBIN A1C  02/15/2025    DIABETIC EYE EXAM  03/13/2025    DIABETIC FOOT EXAM  08/15/2025    LIPID PANEL  08/15/2025    URINE MICROALBUMIN  08/15/2025    BMI FOLLOWUP  10/01/2025    TDAP/TD VACCINES (2 - Td or Tdap) 04/08/2030    COLORECTAL CANCER SCREENING  10/17/2032    HEPATITIS C SCREENING  Completed    Pneumococcal Vaccine 65+  Completed    AAA SCREEN (ONE-TIME)  Completed    ZOSTER VACCINE  Completed       Physical Exam  Vitals and nursing note reviewed.   Constitutional:       General: He is not in acute distress.  Cardiovascular:      Rate and Rhythm: Normal rate.   Pulmonary:      Effort: Pulmonary effort is normal. No respiratory distress.   Musculoskeletal:      Comments: Notable left elbow olecranon bursitis.  No redness, no warmth.  The swelling is soft and fluidlike and mobile.  Full range of motion of the left " elbow.  No tenderness at all.   Neurological:      Mental Status: He is alert.   Psychiatric:         Mood and Affect: Mood normal.         Behavior: Behavior normal.          Result Review :  The following data was reviewed by: Bert Lane MD on 10/23/2024:         Procedures          Assessment & Plan  Olecranon bursitis of left elbow  Olecranon bursitis of the left elbow.  No known trauma or inciting trigger.  Recommended joint rest, avoiding pressure over the affected area.  Ice the swelling for 10-15 minutes every few hours.  I recommend taking ibuprofen in moderation.    Watch for worsening symptoms or signs of infection such as redness, fever, worse swelling and let us know immediately if any of those occur.  He has no signs of infection currently so I am not worried about infectious bursitis.  But I told him to watch out for those symptoms in case they do occur.  Need for influenza vaccination  Flu vaccine given today    Orders Placed This Encounter   Procedures    Fluzone High-Dose 65+yrs ([id number])                       FOLLOW UP  Return if symptoms worsen or fail to improve.  Patient was given instructions and counseling regarding his condition or for health maintenance advice. Please see specific information pulled into the AVS if appropriate.       Bert Lane MD  10/23/24  13:17 EDT    CURRENT & DISCONTINUED MEDICATIONS  Current Outpatient Medications   Medication Instructions    amLODIPine (NORVASC) 5 mg, Oral, Daily    BD Pen Needle Mony U/F 32G X 4 MM misc 1 each, Subcutaneous, Nightly    buPROPion SR (WELLBUTRIN SR) 150 mg, Oral, 2 Times Daily    cyclobenzaprine (FLEXERIL) 10 mg, Oral, Nightly PRN    fenofibrate (TRICOR) 145 mg, Oral, Daily    Glyxambi 25-5 MG tablet 1 tablet, Oral, Daily    HYDROcodone-acetaminophen (NORCO) 5-325 MG per tablet     lisinopril (PRINIVIL,ZESTRIL) 40 mg, Oral, Daily    tamsulosin (FLOMAX) 0.4 mg, Oral, Every Evening    traZODone (DESYREL) 100 mg,  Oral, Every Night at Bedtime    Tresiba FlexTouch 130 Units, Subcutaneous, Nightly       There are no discontinued medications.

## 2024-10-24 DIAGNOSIS — E11.9 TYPE 2 DIABETES MELLITUS WITHOUT COMPLICATION, WITH LONG-TERM CURRENT USE OF INSULIN: ICD-10-CM

## 2024-10-24 DIAGNOSIS — Z79.4 TYPE 2 DIABETES MELLITUS WITHOUT COMPLICATION, WITH LONG-TERM CURRENT USE OF INSULIN: ICD-10-CM

## 2024-10-24 RX ORDER — PEN NEEDLE, DIABETIC 32GX 5/32"
NEEDLE, DISPOSABLE MISCELLANEOUS
Qty: 100 EACH | Refills: 3 | Status: SHIPPED | OUTPATIENT
Start: 2024-10-24

## 2024-10-24 RX ORDER — TAMSULOSIN HYDROCHLORIDE 0.4 MG/1
0.4 CAPSULE ORAL EVERY EVENING
Qty: 90 CAPSULE | Refills: 0 | Status: SHIPPED | OUTPATIENT
Start: 2024-10-24

## 2024-11-21 ENCOUNTER — OFFICE VISIT (OUTPATIENT)
Dept: FAMILY MEDICINE CLINIC | Facility: CLINIC | Age: 64
End: 2024-11-21
Payer: MEDICARE

## 2024-11-21 VITALS
OXYGEN SATURATION: 96 % | BODY MASS INDEX: 36.3 KG/M2 | HEART RATE: 87 BPM | HEIGHT: 72 IN | DIASTOLIC BLOOD PRESSURE: 80 MMHG | WEIGHT: 268 LBS | SYSTOLIC BLOOD PRESSURE: 130 MMHG

## 2024-11-21 DIAGNOSIS — E11.65 TYPE 2 DIABETES MELLITUS WITH HYPERGLYCEMIA, WITH LONG-TERM CURRENT USE OF INSULIN: ICD-10-CM

## 2024-11-21 DIAGNOSIS — Z87.891 PERSONAL HISTORY OF NICOTINE DEPENDENCE: ICD-10-CM

## 2024-11-21 DIAGNOSIS — Z00.00 MEDICARE ANNUAL WELLNESS VISIT, SUBSEQUENT: Primary | ICD-10-CM

## 2024-11-21 DIAGNOSIS — Z12.2 ENCOUNTER FOR SCREENING FOR LUNG CANCER: ICD-10-CM

## 2024-11-21 DIAGNOSIS — Z79.4 TYPE 2 DIABETES MELLITUS WITH HYPERGLYCEMIA, WITH LONG-TERM CURRENT USE OF INSULIN: ICD-10-CM

## 2024-11-21 DIAGNOSIS — Z28.21 COVID-19 VACCINATION DECLINED: ICD-10-CM

## 2024-11-21 LAB — HBA1C MFR BLD: 8.5 % (ref 4.8–5.6)

## 2024-11-21 PROCEDURE — 83036 HEMOGLOBIN GLYCOSYLATED A1C: CPT | Performed by: NURSE PRACTITIONER

## 2024-11-21 NOTE — PROGRESS NOTES
Please mail letter to patient stating    BRANDON the hemoglobin A1c 1 year ago was at 5.7% and then 9 months ago you are at 6.8% and then 3 months ago it jumped up to 7.1% and now currently it is at 8.5% and the goal for diabetic is to be about 7% or less--and you are already on the glyxambi 25/5 which is essentially Jardiance 25 mg and Tradjenta 5 mg and those are maxed out doses and then you could not tolerate the metformin and then regarding your insulin long-acting, Tresiba you are at 130 units nightly the only other additional thing I could think of that might be beneficial would be attempting to add something like once weekly injectable like low-dose Ozempic-and then other than that if not then increasing the insulin to 135-136 units nightly and then recheck at 3 months--there is another class of medications we could try to add which is Actos and we could start with the lowest dose of 50 mg daily and that would be a pill form or if you do not want to do the once weekly injectable of the Ozempic which is a GLP-1 we could try and see if your insurance would cover the Rybelsus which is the oral GLP-1-please let me know what you would be open to and then we could proceed with that

## 2024-11-21 NOTE — PROGRESS NOTES
..  Venipuncture Blood Specimen Collection  Venipuncture performed in Lt arm by Felecia Echevarria MA with good hemostasis. Patient tolerated the procedure well without complications.   11/21/24   Felecia Echevarria MA

## 2024-11-21 NOTE — ASSESSMENT & PLAN NOTE
Diabetes is stable.   Continue current treatment regimen.  Diabetes will be reassessed in 3 months    Orders:    Hemoglobin A1c

## 2024-11-21 NOTE — PROGRESS NOTES
Medicare annual wellness and to spot check A1c on the diabetes  The ABCs of the Annual Wellness Visit  Medicare Wellness Visit      BRANDON Salter II is a 64 y.o. patient who presents for a Medicare Wellness Visit.    The following portions of the patient's history were reviewed and   updated as appropriate: allergies, current medications, past family history, past medical history, past social history, past surgical history, and problem list.    Compared to one year ago, the patient's physical   health is the same.  Compared to one year ago, the patient's mental   health is the same.    Recent Hospitalizations:  He was not admitted to the hospital during the last year.     Current Medical Providers:  Patient Care Team:  Diana Choi APRN as PCP - General (Nurse Practitioner)  Jessica Espitia APRN as Nurse Practitioner (Gastroenterology)  Sunny Villafana MD as Consulting Physician (Sleep Medicine)    Outpatient Medications Prior to Visit   Medication Sig Dispense Refill    amLODIPine (NORVASC) 5 MG tablet Take 1 tablet by mouth Daily. 90 tablet 1    BD Pen Needle Mony 2nd Gen 32G X 4 MM misc USE FOR INJECTIONS EVERY NIGHT AS DIRECTED 100 each 3    buPROPion SR (WELLBUTRIN SR) 150 MG 12 hr tablet Take 1 tablet by mouth 2 (Two) Times a Day. 180 tablet 1    fenofibrate (TRICOR) 145 MG tablet Take 1 tablet by mouth Daily. 90 tablet 1    Glyxambi 25-5 MG tablet TAKE 1 TABLET BY MOUTH DAILY 90 tablet 1    HYDROcodone-acetaminophen (NORCO) 5-325 MG per tablet       lisinopril (PRINIVIL,ZESTRIL) 40 MG tablet TAKE 1 TABLET BY MOUTH DAILY 90 tablet 1    tamsulosin (FLOMAX) 0.4 MG capsule 24 hr capsule TAKE 1 CAPSULE BY MOUTH EVERY EVENING 90 capsule 0    traZODone (DESYREL) 100 MG tablet Take 1 tablet by mouth every night at bedtime. 90 tablet 1    Tresiba FlexTouch 200 UNIT/ML solution pen-injector pen injection Inject 130 Units under the skin into the appropriate area as directed Every Night. 60 mL 1     "cyclobenzaprine (FLEXERIL) 10 MG tablet Take 1 tablet by mouth At Night As Needed for Muscle Spasms. 30 tablet 0     No facility-administered medications prior to visit.     Opioid medication/s are on active medication list.  and I have evaluated his active treatment plan and pain score trends (see table).  Vitals:    11/21/24 0809   PainSc:   2     I have reviewed the chart for potential of high risk medication and harmful drug interactions in the elderly.        Aspirin is not on active medication list.  Aspirin use is not indicated based on review of current medical condition/s. Risk of harm outweighs potential benefits.  .    Patient Active Problem List   Diagnosis    Type 2 diabetes mellitus without complication, with long-term current use of insulin    HTN (hypertension), benign    Hypertriglyceridemia    ROSA on CPAP    Bilateral sensorineural hearing loss    BMI 36.0-36.9,adult    Other emphysema    Type 2 diabetes mellitus with hyperglycemia, with long-term current use of insulin     Advance Care Planning Advance Directive is not on file.  ACP discussion was held with the patient during this visit. Patient does not have an advance directive, declines further assistance.            Objective   Vitals:    11/21/24 0809   BP: 130/80   Pulse: 87   SpO2: 96%   Weight: 122 kg (268 lb)   Height: 182.9 cm (72\")   PainSc:   2       Estimated body mass index is 36.35 kg/m² as calculated from the following:    Height as of this encounter: 182.9 cm (72\").    Weight as of this encounter: 122 kg (268 lb).            Does the patient have evidence of cognitive impairment? No                                                                                                Health  Risk Assessment    Smoking Status:  Social History     Tobacco Use   Smoking Status Former    Current packs/day: 0.00    Average packs/day: 1 pack/day for 45.0 years (45.0 ttl pk-yrs)    Types: Cigarettes    Start date: 1975    Quit date: 2020    " Years since quittin.8    Passive exposure: Past   Smokeless Tobacco Never     Alcohol Consumption:  Social History     Substance and Sexual Activity   Alcohol Use Not Currently       Fall Risk Screen  STEADI Fall Risk Assessment was completed, and patient is at LOW risk for falls.Assessment completed on:2024    Depression Screening   Little interest or pleasure in doing things? Not at all   Feeling down, depressed, or hopeless? Not at all   PHQ-2 Total Score 0      Health Habits and Functional and Cognitive Screenin/21/2024     8:11 AM   Functional & Cognitive Status   Do you have difficulty preparing food and eating? No   Do you have difficulty bathing yourself, getting dressed or grooming yourself? No   Do you have difficulty using the toilet? No   Do you have difficulty moving around from place to place? No   Do you have trouble with steps or getting out of a bed or a chair? No   Current Diet Well Balanced Diet   Dental Exam Up to date   Eye Exam Up to date   Exercise (times per week) 0 times per week   Current Exercises Include No Regular Exercise   Do you need help using the phone?  No   Are you deaf or do you have serious difficulty hearing?  Yes   Do you need help to go to places out of walking distance? No   Do you need help shopping? No   Do you need help preparing meals?  No   Do you need help with housework?  No   Do you need help with laundry? No   Do you need help taking your medications? No   Do you need help managing money? No   Do you ever drive or ride in a car without wearing a seat belt? No   Have you felt unusual stress, anger or loneliness in the last month? No   Who do you live with? Spouse   If you need help, do you have trouble finding someone available to you? No   Have you been bothered in the last four weeks by sexual problems? No   Do you have difficulty concentrating, remembering or making decisions? No           Age-appropriate Screening Schedule:  Refer to the list  below for future screening recommendations based on patient's age, sex and/or medical conditions. Orders for these recommended tests are listed in the plan section. The patient has been provided with a written plan.    Health Maintenance List  Health Maintenance   Topic Date Due    LUNG CANCER SCREENING  12/05/2024    HEMOGLOBIN A1C  02/15/2025    COVID-19 Vaccine (4 - 2024-25 season) 11/01/2025 (Originally 9/1/2024)    DIABETIC EYE EXAM  03/13/2025    LIPID PANEL  08/15/2025    BMI FOLLOWUP  10/01/2025    ANNUAL WELLNESS VISIT  11/21/2025    TDAP/TD VACCINES (2 - Td or Tdap) 04/08/2030    COLORECTAL CANCER SCREENING  10/17/2032    HEPATITIS C SCREENING  Completed    INFLUENZA VACCINE  Completed    Pneumococcal Vaccine 65+  Completed    AAA SCREEN (ONE-TIME)  Completed    ZOSTER VACCINE  Completed    URINE MICROALBUMIN  Discontinued                                                                                                                                                CMS Preventative Services Quick Reference  Risk Factors Identified During Encounter  Immunizations Discussed/Encouraged: COVID19    The above risks/problems have been discussed with the patient.  Pertinent information has been shared with the patient in the After Visit Summary.  An After Visit Summary and PPPS were made available to the patient.    Follow Up:   Next Medicare Wellness visit to be scheduled in 1 year.         Additional E&M Note during same encounter follows:  Patient has additional, significant, and separately identifiable condition(s)/problem(s) that require work above and beyond the Medicare Wellness Visit     Chief Complaint  Medicare Wellness-subsequent    Subjective   1) Medicare annual wellness subsequent visit    2) also to follow-up on the diabetes type 2 insulin-dependent-currently on Lantus 130 units nightly and Glyxambi 25/5 daily and no longer on the metformin as gastroenterology felt that this may be what has started  "this recent onset of functional diarrhea and the patient reports having stopped that and the diarrhea has completely stopped and we are stop taking the A1c at this point to see if anything else needs to be added or changed regarding diabetes    3) also will be due after 12/5/2024 for his annual CT of the chest for lung cancer screening and patient quit smoking approximately 4 years ago and has a history of a 45-pack-year history      BRANDON is also being seen today for an annual adult preventative physical exam.  and BRANDON is also being seen today for additional medical problem/s.    Review of Systems   Eyes:  Negative for visual disturbance.   Respiratory:  Negative for shortness of breath.    Cardiovascular:  Negative for chest pain.   Gastrointestinal:  Negative for abdominal pain, blood in stool and diarrhea.   Endocrine: Positive for polydipsia. Negative for polyphagia and polyuria.   Genitourinary:  Negative for difficulty urinating and dysuria.   Musculoskeletal:         Finger joint and tendon pain of the hands and then stiffness over the past few months and right middle finger trigger finger    Neurological:  Negative for dizziness, seizures, syncope and light-headedness.   Hematological:  Does not bruise/bleed easily.              Objective   Vital Signs:  /80   Pulse 87   Ht 182.9 cm (72\")   Wt 122 kg (268 lb)   SpO2 96%   BMI 36.35 kg/m²   Physical Exam  Vitals and nursing note reviewed.   Constitutional:       Appearance: Normal appearance.   HENT:      Head: Normocephalic.      Right Ear: External ear normal.      Left Ear: External ear normal.      Nose: Nose normal.      Mouth/Throat:      Mouth: Mucous membranes are moist.   Eyes:      Pupils: Pupils are equal, round, and reactive to light.   Cardiovascular:      Rate and Rhythm: Normal rate and regular rhythm.      Heart sounds: Normal heart sounds.   Pulmonary:      Effort: Pulmonary effort is normal.      Breath sounds: Normal breath sounds. "   Abdominal:      Palpations: Abdomen is soft.   Musculoskeletal:         General: Normal range of motion.      Cervical back: Normal range of motion.      Comments: No actual swelling noted at the joints on his fingers and the patient does report that there is more of a stiffness and tightness but there is no pain per se in the joints   Skin:     General: Skin is warm and dry.   Neurological:      Mental Status: He is alert and oriented to person, place, and time.   Psychiatric:         Mood and Affect: Mood normal.         Behavior: Behavior normal.         Thought Content: Thought content normal.         Judgment: Judgment normal.                       Assessment and Plan Additional age appropriate preventative wellness advice topics were discussed during today's preventative wellness exam(some topics already addressed during AWV portion of the note above):    Physical Activity: Advised cardiovascular activity 150 minutes per week as tolerated. (example brisk walk for 30 minutes, 5 days a week).     Nutrition: Discussed nutrition plan with patient. Information shared in after visit summary. Goal is for a well balanced diet to enhance overall health.     Healthy Weight: Discussed current and goal BMI with patient. Steps to attain this goal discussed. Information shared in after visit summary.           Medicare annual wellness visit, subsequent         Type 2 diabetes mellitus with hyperglycemia, with long-term current use of insulin  Diabetes is stable.   Continue current treatment regimen.  Diabetes will be reassessed in 3 months    Orders:    Hemoglobin A1c    Encounter for screening for lung cancer    Orders:     CT Chest Low Dose Cancer Screening WO; Future    Personal history of nicotine dependence    Orders:     CT Chest Low Dose Cancer Screening WO; Future      A1c as noted above and CT of the chest as noted above  COVID-19 vaccination declined                 Follow Up   Return in about 3 months (around  2/10/2025), or if symptoms worsen or fail to improve, for Recheck, fasting labs and refills.  Patient was given instructions and counseling regarding his condition or for health maintenance advice. Please see specific information pulled into the AVS if appropriate.

## 2024-11-23 ENCOUNTER — PATIENT MESSAGE (OUTPATIENT)
Dept: FAMILY MEDICINE CLINIC | Facility: CLINIC | Age: 64
End: 2024-11-23
Payer: MEDICARE

## 2024-11-23 DIAGNOSIS — Z79.4 TYPE 2 DIABETES MELLITUS WITH HYPERGLYCEMIA, WITH LONG-TERM CURRENT USE OF INSULIN: Primary | ICD-10-CM

## 2024-11-23 DIAGNOSIS — E11.65 TYPE 2 DIABETES MELLITUS WITH HYPERGLYCEMIA, WITH LONG-TERM CURRENT USE OF INSULIN: Primary | ICD-10-CM

## 2024-11-25 RX ORDER — LANCETS
1 EACH MISCELLANEOUS
Qty: 400 EACH | Refills: 3 | Status: SHIPPED | OUTPATIENT
Start: 2024-11-25

## 2024-11-25 RX ORDER — BLOOD-GLUCOSE METER
1 EACH MISCELLANEOUS
Qty: 1 KIT | Refills: 0 | Status: SHIPPED | OUTPATIENT
Start: 2024-11-25

## 2024-11-25 RX ORDER — BLOOD SUGAR DIAGNOSTIC
1 STRIP MISCELLANEOUS
Qty: 400 EACH | Refills: 3 | Status: SHIPPED | OUTPATIENT
Start: 2024-11-25

## 2024-12-09 ENCOUNTER — HOSPITAL ENCOUNTER (OUTPATIENT)
Dept: CT IMAGING | Facility: HOSPITAL | Age: 64
Discharge: HOME | End: 2024-12-09
Admitting: NURSE PRACTITIONER
Payer: MEDICARE

## 2024-12-09 DIAGNOSIS — Z12.2 ENCOUNTER FOR SCREENING FOR LUNG CANCER: ICD-10-CM

## 2024-12-09 DIAGNOSIS — Z87.891 PERSONAL HISTORY OF NICOTINE DEPENDENCE: ICD-10-CM

## 2024-12-09 PROCEDURE — 71271 CT THORAX LUNG CANCER SCR C-: CPT

## 2024-12-10 ENCOUNTER — PATIENT MESSAGE (OUTPATIENT)
Dept: FAMILY MEDICINE CLINIC | Facility: CLINIC | Age: 64
End: 2024-12-10
Payer: MEDICARE

## 2024-12-10 DIAGNOSIS — E11.9 TYPE 2 DIABETES MELLITUS WITHOUT COMPLICATION, WITH LONG-TERM CURRENT USE OF INSULIN: ICD-10-CM

## 2024-12-10 DIAGNOSIS — Z79.4 TYPE 2 DIABETES MELLITUS WITHOUT COMPLICATION, WITH LONG-TERM CURRENT USE OF INSULIN: ICD-10-CM

## 2024-12-10 RX ORDER — INSULIN DEGLUDEC 200 U/ML
160 INJECTION, SOLUTION SUBCUTANEOUS NIGHTLY
Qty: 72 ML | Refills: 1 | Status: SHIPPED | OUTPATIENT
Start: 2024-12-10

## 2024-12-11 NOTE — PROGRESS NOTES
Please mail letter to patient stating    BRANDON your CT of the chest for lung cancer screening shows no suspicious pulmonary nodules they do recommend continuing your annual yearly screenings with the low-dose CT of the chest and then it does show some minimal emphysema and then it also shows aortic and coronary atherosclerotic disease and the only way to keep this under control would be to manage diabetes well hypertension well cholesterol well and then a low-cholesterol diet and staying active and then also as an incidental finding there was mild height loss in the vertebra at the T2 level and it said it could possibly be associated with a Schmorl's node (and this is a herniation of the intervertebral disc ) as it is new since 12/5/2023 and it favors a chronic etiology and we could also correlate with any recent history of trauma or if there is any tenderness in that area-then it would warrant a workup

## 2025-01-22 DIAGNOSIS — R35.1 NOCTURIA: ICD-10-CM

## 2025-01-22 RX ORDER — TAMSULOSIN HYDROCHLORIDE 0.4 MG/1
0.4 CAPSULE ORAL EVERY EVENING
Qty: 90 CAPSULE | Refills: 0 | Status: SHIPPED | OUTPATIENT
Start: 2025-01-22

## 2025-02-15 DIAGNOSIS — Z87.891 FORMER CIGARETTE SMOKER: ICD-10-CM

## 2025-02-15 DIAGNOSIS — I10 HTN (HYPERTENSION), BENIGN: ICD-10-CM

## 2025-02-15 DIAGNOSIS — G47.09 OTHER INSOMNIA: ICD-10-CM

## 2025-02-16 RX ORDER — TRAZODONE HYDROCHLORIDE 100 MG/1
100 TABLET ORAL
Qty: 90 TABLET | Refills: 0 | Status: SHIPPED | OUTPATIENT
Start: 2025-02-16

## 2025-02-16 RX ORDER — BUPROPION HYDROCHLORIDE 150 MG/1
150 TABLET, EXTENDED RELEASE ORAL 2 TIMES DAILY
Qty: 180 TABLET | Refills: 0 | Status: SHIPPED | OUTPATIENT
Start: 2025-02-16

## 2025-02-16 RX ORDER — AMLODIPINE BESYLATE 5 MG/1
5 TABLET ORAL DAILY
Qty: 90 TABLET | Refills: 0 | Status: SHIPPED | OUTPATIENT
Start: 2025-02-16

## 2025-02-20 ENCOUNTER — OFFICE VISIT (OUTPATIENT)
Dept: FAMILY MEDICINE CLINIC | Facility: CLINIC | Age: 65
End: 2025-02-20
Payer: MEDICARE

## 2025-02-20 VITALS
HEART RATE: 86 BPM | SYSTOLIC BLOOD PRESSURE: 128 MMHG | OXYGEN SATURATION: 98 % | HEIGHT: 72 IN | DIASTOLIC BLOOD PRESSURE: 72 MMHG | WEIGHT: 272 LBS | TEMPERATURE: 97.6 F | BODY MASS INDEX: 36.84 KG/M2

## 2025-02-20 DIAGNOSIS — E78.1 HYPERTRIGLYCERIDEMIA: ICD-10-CM

## 2025-02-20 DIAGNOSIS — Z79.4 TYPE 2 DIABETES MELLITUS WITHOUT COMPLICATION, WITH LONG-TERM CURRENT USE OF INSULIN: Primary | ICD-10-CM

## 2025-02-20 DIAGNOSIS — E11.9 TYPE 2 DIABETES MELLITUS WITHOUT COMPLICATION, WITH LONG-TERM CURRENT USE OF INSULIN: Primary | ICD-10-CM

## 2025-02-20 LAB
ANION GAP SERPL CALCULATED.3IONS-SCNC: 12 MMOL/L (ref 5–15)
BUN SERPL-MCNC: 18 MG/DL (ref 8–23)
BUN/CREAT SERPL: 16.5 (ref 7–25)
CALCIUM SPEC-SCNC: 9.6 MG/DL (ref 8.6–10.5)
CHLORIDE SERPL-SCNC: 103 MMOL/L (ref 98–107)
CO2 SERPL-SCNC: 21 MMOL/L (ref 22–29)
CREAT SERPL-MCNC: 1.09 MG/DL (ref 0.76–1.27)
EGFRCR SERPLBLD CKD-EPI 2021: 74.9 ML/MIN/1.73
GLUCOSE SERPL-MCNC: 197 MG/DL (ref 65–99)
HBA1C MFR BLD: 7.7 % (ref 4.8–5.6)
POTASSIUM SERPL-SCNC: 4.3 MMOL/L (ref 3.5–5.2)
SODIUM SERPL-SCNC: 136 MMOL/L (ref 136–145)

## 2025-02-20 PROCEDURE — 83036 HEMOGLOBIN GLYCOSYLATED A1C: CPT | Performed by: NURSE PRACTITIONER

## 2025-02-20 PROCEDURE — 80048 BASIC METABOLIC PNL TOTAL CA: CPT | Performed by: NURSE PRACTITIONER

## 2025-02-20 RX ORDER — FENOFIBRATE 145 MG/1
145 TABLET, COATED ORAL DAILY
Qty: 90 TABLET | Refills: 0 | Status: SHIPPED | OUTPATIENT
Start: 2025-02-20

## 2025-02-20 NOTE — PROGRESS NOTES
Venipuncture Blood Specimen Collection  Venipuncture performed in left arm by Va Arnold with good hemostasis. Patient tolerated the procedure well without complications.   02/20/25   Va Arnold

## 2025-02-20 NOTE — PROGRESS NOTES
Chief Complaint  Diabetes (Needs his A1C done. He would like his paper work to be filled out for his brother. )    Subjective            BRANDON Salter II presents to [facility]  Diabetes  He presents for his follow-up diabetic visit. He has type 2 diabetes mellitus. His disease course has been fluctuating. Pertinent negatives for hypoglycemia include no dizziness or seizures. Associated symptoms include polydipsia and polyuria. Pertinent negatives for diabetes include no blurred vision, no chest pain, no polyphagia and no weight loss. Pertinent negatives for hypoglycemia complications include no blackouts, no hospitalization, no nocturnal hypoglycemia, no required assistance and no required glucagon injection. Pertinent negatives for diabetic complications include no autonomic neuropathy, CVA, heart disease, impotence, nephropathy, peripheral neuropathy, PVD or retinopathy. Risk factors for coronary artery disease include diabetes mellitus, dyslipidemia, hypertension and male sex. Current diabetic treatment includes intensive insulin program and oral agent (dual therapy). He is compliant with treatment all of the time. His weight is stable. He is following a generally healthy diet. His home blood glucose trend is fluctuating minimally. His breakfast blood glucose range is generally 110-130 mg/dl. His highest blood glucose is 130-140 mg/dl. ( range of the fasting ) An ACE inhibitor/angiotensin II receptor blocker is being taken.       History of Present Illness  The patient is a 64-year-old male who presents for a follow-up on his diabetes.    He reports that his A1c levels were elevated, leading to an increase in his insulin dosage. He has discontinued the use of metformin. His blood glucose levels fluctuate significantly, with readings ranging from 80 to 140 in the morning, depending on his dietary intake the previous night. He attributes these fluctuations to late dinner  times, as he typically consumes his evening meal within 1 to 2 hours of bedtime. He reports no unexpected weight changes. He reports no chest pain or shortness of breath. He reports no dizziness, lightheadedness, seizures, or fainting episodes. He reports no difficulty urinating or frequent nighttime urination. He reports no abdominal pain or blood in the stool. He experiences occasional blurry vision, predominantly at night when fatigued. He does not experience excessive thirst or hunger but acknowledges frequent urination due to high fluid intake. He has been off smoking for 5 years. He is currently on Glyxambi and Tresiba 160 units for his diabetes management.    Supplemental Information  He has been using tamsulosin, which has improved his urinary symptoms. He has not been fasting recently. He has not been on hydrocodone anymore.    SOCIAL HISTORY  He quit smoking 5 years ago.    MEDICATIONS  Current: Fenofibrate, Wellbutrin, Glyxambi, Tresiba, tamsulosin  Discontinued: Hydrocodone, metformin      PHQ-2 Total Score: 0  PHQ-9 Total Score:      Past Medical History:   Diagnosis Date    Cataract     Colon polyp     Diabetes mellitus     Foot pain, bilateral     HL (hearing loss)     Hyperlipidemia     Hypertension        Allergies   Allergen Reactions    Metformin Diarrhea        Past Surgical History:   Procedure Laterality Date    CLAVICLE SURGERY Right         Social History     Tobacco Use    Smoking status: Former     Current packs/day: 0.00     Average packs/day: 1 pack/day for 45.0 years (45.0 ttl pk-yrs)     Types: Cigarettes     Start date:      Quit date:      Years since quittin.1     Passive exposure: Past    Smokeless tobacco: Never   Vaping Use    Vaping status: Never Used   Substance Use Topics    Alcohol use: Not Currently    Drug use: Not Currently     Types: Marijuana       Family History   Problem Relation Age of Onset    Heart disease Mother     Cancer Mother     Hyperlipidemia  Father     Diabetes Father     COPD Father     Hypertension Father     Sleep apnea Father     Restless legs syndrome Father     Other Brother     Colon cancer Neg Hx         Health Maintenance Due   Topic Date Due    URINE MICROALBUMIN-CREATININE RATIO (uACR)  02/04/2024    DIABETIC EYE EXAM  03/13/2025        Current Outpatient Medications on File Prior to Visit   Medication Sig    Accu-Chek FastClix Lancets misc Use 1 each 4 (Four) Times a Day Before Meals & at Bedtime.    amLODIPine (NORVASC) 5 MG tablet Take 1 tablet by mouth Daily.    BD Pen Needle Mony 2nd Gen 32G X 4 MM misc USE FOR INJECTIONS EVERY NIGHT AS DIRECTED    Blood Glucose Monitoring Suppl (Accu-Chek Thi Plus) w/Device kit Use 1 each 4 (Four) Times a Day Before Meals & at Bedtime.    buPROPion SR (WELLBUTRIN SR) 150 MG 12 hr tablet Take 1 tablet by mouth 2 (Two) Times a Day.    glucose blood (Accu-Chek Thi Plus) test strip 1 each by Other route 4 (Four) Times a Day Before Meals & at Bedtime. Use as instructed    Glyxambi 25-5 MG tablet TAKE 1 TABLET BY MOUTH DAILY    lisinopril (PRINIVIL,ZESTRIL) 40 MG tablet TAKE 1 TABLET BY MOUTH DAILY    tamsulosin (FLOMAX) 0.4 MG capsule 24 hr capsule TAKE 1 CAPSULE BY MOUTH EVERY EVENING    traZODone (DESYREL) 100 MG tablet Take 1 tablet by mouth every night at bedtime.    Tresiba FlexTouch 200 UNIT/ML solution pen-injector pen injection Inject 160 Units under the skin into the appropriate area as directed Every Night.    [DISCONTINUED] fenofibrate (TRICOR) 145 MG tablet Take 1 tablet by mouth Daily.    [DISCONTINUED] HYDROcodone-acetaminophen (NORCO) 5-325 MG per tablet  (Patient not taking: Reported on 2/20/2025)     No current facility-administered medications on file prior to visit.       Immunization History   Administered Date(s) Administered    COVID-19 (PFIZER) Purple Cap Monovalent 03/08/2021, 03/29/2021, 01/13/2022    Fluzone (or Fluarix & Flulaval for VFC) >6mos 10/20/2014, 09/10/2020,  "10/04/2020, 10/27/2021, 10/26/2022    Fluzone High-Dose 65+YRS 10/23/2024    Fluzone High-Dose 65+yrs 11/01/2023    Influenza Seasonal Injectable 11/06/2016, 10/04/2020    Pneumococcal Conjugate 20-Valent (PCV20) 04/26/2023    Pneumococcal Polysaccharide (PPSV23) 08/13/2016, 10/24/2016    Shingrix 09/10/2019, 01/15/2020    Tdap 04/08/2020       Review of Systems   Constitutional:  Negative for unexpected weight gain and unexpected weight loss.   HENT:  Negative for trouble swallowing.    Eyes:  Negative for blurred vision and double vision.   Respiratory:  Negative for shortness of breath.    Cardiovascular:  Negative for chest pain.   Gastrointestinal:  Negative for abdominal pain and blood in stool.   Endocrine: Positive for polydipsia and polyuria. Negative for polyphagia.   Genitourinary:  Negative for difficulty urinating, impotence and nocturia.   Neurological:  Negative for dizziness, seizures, syncope and light-headedness.   Psychiatric/Behavioral: Negative.          Objective     /72   Pulse 86   Temp 97.6 °F (36.4 °C) (Temporal)   Ht 181.6 cm (71.5\")   Wt 123 kg (272 lb)   SpO2 98%   BMI 37.41 kg/m²       Physical Exam  Vitals and nursing note reviewed.   Constitutional:       Appearance: Normal appearance.   HENT:      Head: Normocephalic.      Right Ear: External ear normal.      Left Ear: External ear normal.      Nose: Nose normal.      Mouth/Throat:      Mouth: Mucous membranes are moist.   Eyes:      Pupils: Pupils are equal, round, and reactive to light.   Cardiovascular:      Rate and Rhythm: Normal rate and regular rhythm.      Heart sounds: Normal heart sounds.   Pulmonary:      Effort: Pulmonary effort is normal.      Breath sounds: Normal breath sounds.   Musculoskeletal:         General: Normal range of motion.      Cervical back: Normal range of motion.   Skin:     General: Skin is warm and dry.   Neurological:      Mental Status: He is alert and oriented to person, place, and " time.   Psychiatric:         Mood and Affect: Mood normal.         Behavior: Behavior normal.         Thought Content: Thought content normal.         Judgment: Judgment normal.         Result Review :                    Results  Laboratory Studies  A1c was high. Fasting blood sugar ranges from 80 to 140.               Assessment and Plan      Diagnoses and all orders for this visit:    1. Type 2 diabetes mellitus without complication, with long-term current use of insulin (Primary)  -     Basic Metabolic Panel  -     Hemoglobin A1c    2. Hypertriglyceridemia  -     fenofibrate (TRICOR) 145 MG tablet; Take 1 tablet by mouth Daily.  Dispense: 90 tablet; Refill: 0  -     Basic Metabolic Panel          Assessment & Plan  1. Diabetes Mellitus.  His A1c levels have been elevated, leading to an increase in insulin dosage. He reports variable fasting blood sugar levels ranging from 80 to 140 mg/dL. He is currently on Glyxambi and Tresiba 160 units for diabetes management. A basic metabolic panel will be ordered to monitor electrolyte balance and kidney function. Fasting labs will be conducted during his next visit in 3 months.    Follow-up  The patient will follow up in 3 months.          Follow Up     Return in about 3 months (around 5/20/2025) for fasting labs and refills.    Patient was given instructions and counseling regarding his condition or for health maintenance advice. Please see specific information pulled into the AVS if appropriate.            BRANDON Salter II  reports that he quit smoking about 5 years ago. His smoking use included cigarettes. He started smoking about 50 years ago. He has a 45 pack-year smoking history. He has been exposed to tobacco smoke. He has never used smokeless tobacco. I have educated him on the risk of diseases from using tobacco products such as cancer, COPD, and heart disease.              Patient or patient representative verbalized consent for the use of Ambient Listening during  the visit with  CHAZ Jacques for chart documentation. 2/20/2025  16:33 EST

## 2025-02-21 NOTE — PROGRESS NOTES
Please mail letter to patient stating    BRANDON your basic metabolic panel shows glucose 197 normal electrolytes and normal kidney function tests and then the hemoglobin A1c dropped from 3 months ago it was 8.5% down to 7.7%--and I know right now on the Tresiba you are taking 160 units daily you could increase that to 164 units daily and then we will see where you are at in 3 months when you follow-up and we do all of your fasting labs

## 2025-02-22 DIAGNOSIS — Z79.4 TYPE 2 DIABETES MELLITUS WITH HYPERGLYCEMIA, WITH LONG-TERM CURRENT USE OF INSULIN: ICD-10-CM

## 2025-02-22 DIAGNOSIS — E11.65 TYPE 2 DIABETES MELLITUS WITH HYPERGLYCEMIA, WITH LONG-TERM CURRENT USE OF INSULIN: ICD-10-CM

## 2025-02-24 RX ORDER — BLOOD-GLUCOSE METER
KIT MISCELLANEOUS
Qty: 1 KIT | Refills: 0 | Status: SHIPPED | OUTPATIENT
Start: 2025-02-24

## 2025-03-20 DIAGNOSIS — E11.9 TYPE 2 DIABETES MELLITUS WITHOUT COMPLICATION, WITH LONG-TERM CURRENT USE OF INSULIN: ICD-10-CM

## 2025-03-20 DIAGNOSIS — Z79.4 TYPE 2 DIABETES MELLITUS WITHOUT COMPLICATION, WITH LONG-TERM CURRENT USE OF INSULIN: ICD-10-CM

## 2025-03-20 DIAGNOSIS — I10 HTN (HYPERTENSION), BENIGN: ICD-10-CM

## 2025-03-20 RX ORDER — LISINOPRIL 40 MG/1
40 TABLET ORAL DAILY
Qty: 90 TABLET | Refills: 0 | Status: SHIPPED | OUTPATIENT
Start: 2025-03-20

## 2025-03-20 RX ORDER — EMPAGLIFLOZIN AND LINAGLIPTIN 25; 5 MG/1; MG/1
1 TABLET, FILM COATED ORAL DAILY
Qty: 90 TABLET | Refills: 0 | Status: SHIPPED | OUTPATIENT
Start: 2025-03-20

## 2025-03-25 ENCOUNTER — APPOINTMENT (OUTPATIENT)
Dept: CT IMAGING | Facility: HOSPITAL | Age: 65
End: 2025-03-25
Payer: MEDICARE

## 2025-03-25 ENCOUNTER — HOSPITAL ENCOUNTER (EMERGENCY)
Facility: HOSPITAL | Age: 65
Discharge: HOME | End: 2025-03-25
Attending: EMERGENCY MEDICINE | Admitting: EMERGENCY MEDICINE
Payer: MEDICARE

## 2025-03-25 VITALS
TEMPERATURE: 97.7 F | DIASTOLIC BLOOD PRESSURE: 59 MMHG | BODY MASS INDEX: 35.82 KG/M2 | HEART RATE: 86 BPM | HEIGHT: 73 IN | SYSTOLIC BLOOD PRESSURE: 133 MMHG | WEIGHT: 270.28 LBS | OXYGEN SATURATION: 98 % | RESPIRATION RATE: 18 BRPM

## 2025-03-25 DIAGNOSIS — R10.84 GENERALIZED ABDOMINAL PAIN: Primary | ICD-10-CM

## 2025-03-25 DIAGNOSIS — K52.9 ENTERITIS: ICD-10-CM

## 2025-03-25 LAB
ALBUMIN SERPL-MCNC: 4.7 G/DL (ref 3.5–5.2)
ALBUMIN/GLOB SERPL: 1.1 G/DL
ALP SERPL-CCNC: 63 U/L (ref 39–117)
ALT SERPL W P-5'-P-CCNC: 19 U/L (ref 1–41)
ANION GAP SERPL CALCULATED.3IONS-SCNC: 17.6 MMOL/L (ref 5–15)
AST SERPL-CCNC: 16 U/L (ref 1–40)
BASOPHILS # BLD AUTO: 0.02 10*3/MM3 (ref 0–0.2)
BASOPHILS NFR BLD AUTO: 0.1 % (ref 0–1.5)
BILIRUB SERPL-MCNC: 0.4 MG/DL (ref 0–1.2)
BILIRUB UR QL STRIP: NEGATIVE
BUN SERPL-MCNC: 22 MG/DL (ref 8–23)
BUN/CREAT SERPL: 22 (ref 7–25)
CALCIUM SPEC-SCNC: 9.6 MG/DL (ref 8.6–10.5)
CHLORIDE SERPL-SCNC: 94 MMOL/L (ref 98–107)
CLARITY UR: CLEAR
CO2 SERPL-SCNC: 21.4 MMOL/L (ref 22–29)
COLOR UR: YELLOW
CREAT SERPL-MCNC: 1 MG/DL (ref 0.76–1.27)
D-LACTATE SERPL-SCNC: 1.4 MMOL/L (ref 0.5–2)
DEPRECATED RDW RBC AUTO: 39.8 FL (ref 37–54)
EGFRCR SERPLBLD CKD-EPI 2021: 82.5 ML/MIN/1.73
EOSINOPHIL # BLD AUTO: 0 10*3/MM3 (ref 0–0.4)
EOSINOPHIL NFR BLD AUTO: 0 % (ref 0.3–6.2)
ERYTHROCYTE [DISTWIDTH] IN BLOOD BY AUTOMATED COUNT: 14.2 % (ref 12.3–15.4)
GLOBULIN UR ELPH-MCNC: 4.1 GM/DL
GLUCOSE SERPL-MCNC: 238 MG/DL (ref 65–99)
GLUCOSE UR STRIP-MCNC: ABNORMAL MG/DL
HCT VFR BLD AUTO: 47.4 % (ref 37.5–51)
HGB BLD-MCNC: 16.3 G/DL (ref 13–17.7)
HGB UR QL STRIP.AUTO: NEGATIVE
HOLD SPECIMEN: NORMAL
HOLD SPECIMEN: NORMAL
IMM GRANULOCYTES # BLD AUTO: 0.07 10*3/MM3 (ref 0–0.05)
IMM GRANULOCYTES NFR BLD AUTO: 0.5 % (ref 0–0.5)
KETONES UR QL STRIP: NEGATIVE
LEUKOCYTE ESTERASE UR QL STRIP.AUTO: NEGATIVE
LIPASE SERPL-CCNC: 23 U/L (ref 13–60)
LYMPHOCYTES # BLD AUTO: 1.8 10*3/MM3 (ref 0.7–3.1)
LYMPHOCYTES NFR BLD AUTO: 13.5 % (ref 19.6–45.3)
MCH RBC QN AUTO: 26.8 PG (ref 26.6–33)
MCHC RBC AUTO-ENTMCNC: 34.4 G/DL (ref 31.5–35.7)
MCV RBC AUTO: 78 FL (ref 79–97)
MONOCYTES # BLD AUTO: 0.58 10*3/MM3 (ref 0.1–0.9)
MONOCYTES NFR BLD AUTO: 4.3 % (ref 5–12)
NEUTROPHILS NFR BLD AUTO: 10.9 10*3/MM3 (ref 1.7–7)
NEUTROPHILS NFR BLD AUTO: 81.6 % (ref 42.7–76)
NITRITE UR QL STRIP: NEGATIVE
NRBC BLD AUTO-RTO: 0 /100 WBC (ref 0–0.2)
PH UR STRIP.AUTO: 7 [PH] (ref 5–8)
PLATELET # BLD AUTO: 304 10*3/MM3 (ref 140–450)
PMV BLD AUTO: 10 FL (ref 6–12)
POTASSIUM SERPL-SCNC: 4.1 MMOL/L (ref 3.5–5.2)
PROT SERPL-MCNC: 8.8 G/DL (ref 6–8.5)
PROT UR QL STRIP: ABNORMAL
RBC # BLD AUTO: 6.08 10*6/MM3 (ref 4.14–5.8)
SODIUM SERPL-SCNC: 133 MMOL/L (ref 136–145)
SP GR UR STRIP: >1.03 (ref 1–1.03)
UROBILINOGEN UR QL STRIP: ABNORMAL
WBC NRBC COR # BLD AUTO: 13.37 10*3/MM3 (ref 3.4–10.8)
WHOLE BLOOD HOLD COAG: NORMAL
WHOLE BLOOD HOLD SPECIMEN: NORMAL

## 2025-03-25 PROCEDURE — 36415 COLL VENOUS BLD VENIPUNCTURE: CPT

## 2025-03-25 PROCEDURE — 99285 EMERGENCY DEPT VISIT HI MDM: CPT

## 2025-03-25 PROCEDURE — 85025 COMPLETE CBC W/AUTO DIFF WBC: CPT

## 2025-03-25 PROCEDURE — 25010000002 ONDANSETRON PER 1 MG: Performed by: EMERGENCY MEDICINE

## 2025-03-25 PROCEDURE — 83605 ASSAY OF LACTIC ACID: CPT

## 2025-03-25 PROCEDURE — 25510000001 IOPAMIDOL PER 1 ML: Performed by: EMERGENCY MEDICINE

## 2025-03-25 PROCEDURE — 96375 TX/PRO/DX INJ NEW DRUG ADDON: CPT

## 2025-03-25 PROCEDURE — 25010000002 MORPHINE PER 10 MG: Performed by: EMERGENCY MEDICINE

## 2025-03-25 PROCEDURE — 81003 URINALYSIS AUTO W/O SCOPE: CPT | Performed by: EMERGENCY MEDICINE

## 2025-03-25 PROCEDURE — 74177 CT ABD & PELVIS W/CONTRAST: CPT

## 2025-03-25 PROCEDURE — 83690 ASSAY OF LIPASE: CPT | Performed by: EMERGENCY MEDICINE

## 2025-03-25 PROCEDURE — 80053 COMPREHEN METABOLIC PANEL: CPT | Performed by: EMERGENCY MEDICINE

## 2025-03-25 PROCEDURE — 25810000003 SODIUM CHLORIDE 0.9 % SOLUTION: Performed by: EMERGENCY MEDICINE

## 2025-03-25 PROCEDURE — 96374 THER/PROPH/DIAG INJ IV PUSH: CPT

## 2025-03-25 PROCEDURE — 96361 HYDRATE IV INFUSION ADD-ON: CPT

## 2025-03-25 RX ORDER — SODIUM CHLORIDE 0.9 % (FLUSH) 0.9 %
10 SYRINGE (ML) INJECTION AS NEEDED
Status: DISCONTINUED | OUTPATIENT
Start: 2025-03-25 | End: 2025-03-25 | Stop reason: HOSPADM

## 2025-03-25 RX ORDER — ONDANSETRON 4 MG/1
4 TABLET, ORALLY DISINTEGRATING ORAL 4 TIMES DAILY PRN
Qty: 20 TABLET | Refills: 0 | Status: SHIPPED | OUTPATIENT
Start: 2025-03-25

## 2025-03-25 RX ORDER — ONDANSETRON 2 MG/ML
4 INJECTION INTRAMUSCULAR; INTRAVENOUS ONCE
Status: COMPLETED | OUTPATIENT
Start: 2025-03-25 | End: 2025-03-25

## 2025-03-25 RX ORDER — IOPAMIDOL 755 MG/ML
100 INJECTION, SOLUTION INTRAVASCULAR
Status: COMPLETED | OUTPATIENT
Start: 2025-03-25 | End: 2025-03-25

## 2025-03-25 RX ADMIN — ONDANSETRON 4 MG: 2 INJECTION INTRAMUSCULAR; INTRAVENOUS at 10:36

## 2025-03-25 RX ADMIN — IOPAMIDOL 100 ML: 755 INJECTION, SOLUTION INTRAVENOUS at 10:33

## 2025-03-25 RX ADMIN — MORPHINE SULFATE 4 MG: 4 INJECTION, SOLUTION INTRAMUSCULAR; INTRAVENOUS at 10:36

## 2025-03-25 RX ADMIN — SODIUM CHLORIDE 1000 ML: 0.9 INJECTION, SOLUTION INTRAVENOUS at 10:35

## 2025-03-25 NOTE — ED PROVIDER NOTES
Time: 10:10 AM EDT  Date of encounter:  3/25/2025  Independent Historian/Clinical History and Information was obtained by:   Patient    History is limited by: N/A    Chief Complaint: Abdominal pain      History of Present Illness:  Patient is a 65 y.o. year old male who presents to the emergency department for evaluation of abdominal pain.  Patient has a history of diabetes, hypertension, hyperlipidemia.  States that he started having belly pain last night with associated nausea and vomiting.  Denies any diarrhea.  States he has been belching a lot.  Patient reports he feels like he has rocks in the middle of his abdomen.  No other complaints this time.      Patient Care Team  Primary Care Provider: Diana Choi APRN    Past Medical History:     Allergies   Allergen Reactions    Metformin Diarrhea     Past Medical History:   Diagnosis Date    Cataract     Colon polyp     Diabetes mellitus     Foot pain, bilateral     HL (hearing loss)     Hyperlipidemia     Hypertension      Past Surgical History:   Procedure Laterality Date    CLAVICLE SURGERY Right      Family History   Problem Relation Age of Onset    Heart disease Mother     Cancer Mother     Hyperlipidemia Father     Diabetes Father     COPD Father     Hypertension Father     Sleep apnea Father     Restless legs syndrome Father     Other Brother     Colon cancer Neg Hx        Home Medications:  Prior to Admission medications    Medication Sig Start Date End Date Taking? Authorizing Provider   Accu-Chek FastClix Lancets misc Use 1 each 4 (Four) Times a Day Before Meals & at Bedtime. 11/25/24   Diana Choi APRN   amLODIPine (NORVASC) 5 MG tablet Take 1 tablet by mouth Daily. 2/16/25   Diana Choi APRN   BD Pen Needle Mony 2nd Gen 32G X 4 MM misc USE FOR INJECTIONS EVERY NIGHT AS DIRECTED 10/24/24   Diana Choi APRN   Blood Glucose Monitoring Suppl (FreeStyle Lite) w/Device kit USE TO TEST FOUR TIMES DAILY BEFORE MEALS AND AT  "BEDTIME 25   Diana Choi APRN   buPROPion SR (WELLBUTRIN SR) 150 MG 12 hr tablet Take 1 tablet by mouth 2 (Two) Times a Day. 25   Diana Choi APRN   fenofibrate (TRICOR) 145 MG tablet Take 1 tablet by mouth Daily. 25   Diana Choi APRN   glucose blood (Accu-Chek Thi Plus) test strip 1 each by Other route 4 (Four) Times a Day Before Meals & at Bedtime. Use as instructed 24   Diana Choi APRN   Glyxambi 25-5 MG tablet Take 1 tablet by mouth Daily. 3/20/25   Diana Choi APRN   lisinopril (PRINIVIL,ZESTRIL) 40 MG tablet Take 1 tablet by mouth Daily. 3/20/25   Diana Choi APRN   tamsulosin (FLOMAX) 0.4 MG capsule 24 hr capsule TAKE 1 CAPSULE BY MOUTH EVERY EVENING 25   Diana Choi APRN   traZODone (DESYREL) 100 MG tablet Take 1 tablet by mouth every night at bedtime. 25   Diana Choi APRN   Tresiba FlexTouch 200 UNIT/ML solution pen-injector pen injection Inject 160 Units under the skin into the appropriate area as directed Every Night. 12/10/24   Diana Choi APRN        Social History:   Social History     Tobacco Use    Smoking status: Former     Current packs/day: 0.00     Average packs/day: 1 pack/day for 45.0 years (45.0 ttl pk-yrs)     Types: Cigarettes     Start date:      Quit date: 2020     Years since quittin.2     Passive exposure: Past    Smokeless tobacco: Never   Vaping Use    Vaping status: Never Used   Substance Use Topics    Alcohol use: Not Currently    Drug use: Not Currently     Types: Marijuana         Review of Systems:  Review of Systems     Physical Exam:  /67   Pulse 88   Temp 98.1 °F (36.7 °C) (Oral)   Resp 18   Ht 185.4 cm (73\")   Wt 123 kg (270 lb 4.5 oz)   SpO2 99%   BMI 35.66 kg/m²     Physical Exam  Vitals and nursing note reviewed.   Constitutional:       Appearance: Normal appearance.   HENT:      Head: Normocephalic and atraumatic.   Eyes:      " General: No scleral icterus.  Cardiovascular:      Rate and Rhythm: Normal rate and regular rhythm.      Heart sounds: Normal heart sounds.   Pulmonary:      Effort: Pulmonary effort is normal.      Breath sounds: Normal breath sounds.   Abdominal:      Palpations: Abdomen is soft.      Tenderness: There is generalized abdominal tenderness.   Musculoskeletal:         General: Normal range of motion.      Cervical back: Normal range of motion.   Skin:     Findings: No rash.   Neurological:      General: No focal deficit present.      Mental Status: He is alert.                    Medical Decision Making:      Comorbidities that affect care:    Obesity, hypertension    External Notes reviewed:  Reviewed visit from 2/20/2025        The following orders were placed and all results were independently analyzed by me:  Orders Placed This Encounter   Procedures    CT Abdomen Pelvis With Contrast    [place] Draw    Comprehensive Metabolic Panel    Lipase    Urinalysis With Microscopic If Indicated (No Culture) - Urine, Clean Catch    Lactic Acid, Plasma    CBC Auto Differential    NPO Diet NPO Type: Strict NPO    Undress & Gown    IP Consult to General Surgery    Insert Peripheral IV    CBC & Differential    Green Top (Gel)    Lavender Top    Gold Top - SST    Light Blue Top       Medications Given in the Emergency Department:  Medications   sodium chloride 0.9 % flush 10 mL (has no administration in time range)   sodium chloride 0.9 % bolus 1,000 mL (0 mL Intravenous Stopped 3/25/25 1224)   ondansetron (ZOFRAN) injection 4 mg (4 mg Intravenous Given 3/25/25 1036)   morphine injection 4 mg (4 mg Intravenous Given 3/25/25 1036)   iopamidol (ISOVUE-370) 76 % injection 100 mL (100 mL Intravenous Given 3/25/25 1033)        ED Course:    ED Course as of 03/25/25 1238   Tue Mar 25, 2025   1055 Spoke with Dr. Moreno who states this does not look like a small bowel obstruction.  Has stool throughout the colon.  Appears to be more  constipation.  Reassessment. [MA]   1136 Patient has now had a bowel movement here.  Reports his pain is improved.  Will p.o. challenge and reassess. [MA]   1225 Patient has tolerated p.o. and reports improvement.  Likely enteritis.  Recommend pushing p.o. fluids at home.  Strong return precautions given. [MA]      ED Course User Index  [MA] Trace Jaramillo MD       Labs:    Lab Results (last 24 hours)       Procedure Component Value Units Date/Time    CBC & Differential [[id number]]  (Abnormal) Collected: 03/25/25 0845    Specimen: Blood Updated: 03/25/25 0853    Narrative:      The following orders were created for panel order CBC & Differential.  Procedure                               Abnormality         Status                     ---------                               -----------         ------                     CBC Auto Differential[[id number]]        Abnormal            Final result                 Please view results for these tests on the individual orders.    Lactic Acid, Plasma [[id number]]  (Normal) Collected: 03/25/25 0845    Specimen: Blood Updated: 03/25/25 0907     Lactate 1.4 mmol/L     CBC Auto Differential [[id number]]  (Abnormal) Collected: 03/25/25 0845    Specimen: Blood Updated: 03/25/25 0853     WBC 13.37 10*3/mm3      RBC 6.08 10*6/mm3      Hemoglobin 16.3 g/dL      Hematocrit 47.4 %      MCV 78.0 fL      MCH 26.8 pg      MCHC 34.4 g/dL      RDW 14.2 %      RDW-SD 39.8 fl      MPV 10.0 fL      Platelets 304 10*3/mm3      Neutrophil % 81.6 %      Lymphocyte % 13.5 %      Monocyte % 4.3 %      Eosinophil % 0.0 %      Basophil % 0.1 %      Immature Grans % 0.5 %      Neutrophils, Absolute 10.90 10*3/mm3      Lymphocytes, Absolute 1.80 10*3/mm3      Monocytes, Absolute 0.58 10*3/mm3      Eosinophils, Absolute 0.00 10*3/mm3      Basophils, Absolute 0.02 10*3/mm3      Immature Grans, Absolute 0.07 10*3/mm3      nRBC 0.0 /100 WBC     Comprehensive Metabolic Panel [[id number]]  (Abnormal)  Collected: 03/25/25 0927    Specimen: Blood from Hand, Left Updated: 03/25/25 1004     Glucose 238 mg/dL      BUN 22 mg/dL      Creatinine 1.00 mg/dL      Sodium 133 mmol/L      Potassium 4.1 mmol/L      Chloride 94 mmol/L      CO2 21.4 mmol/L      Calcium 9.6 mg/dL      Total Protein 8.8 g/dL      Albumin 4.7 g/dL      ALT (SGPT) 19 U/L      AST (SGOT) 16 U/L      Alkaline Phosphatase 63 U/L      Total Bilirubin 0.4 mg/dL      Globulin 4.1 gm/dL      A/G Ratio 1.1 g/dL      BUN/Creatinine Ratio 22.0     Anion Gap 17.6 mmol/L      eGFR 82.5 mL/min/1.73     Narrative:      GFR Categories in Chronic Kidney Disease (CKD)      GFR Category          GFR (mL/min/1.73)    Interpretation  G1                     90 or greater         Normal or high (1)  G2                      60-89                Mild decrease (1)  G3a                   45-59                Mild to moderate decrease  G3b                   30-44                Moderate to severe decrease  G4                    15-29                Severe decrease  G5                    14 or less           Kidney failure          (1)In the absence of evidence of kidney disease, neither GFR category G1 or G2 fulfill the criteria for CKD.    eGFR calculation 2021 CKD-EPI creatinine equation, which does not include race as a factor    Lipase [[id number]]  (Normal) Collected: 03/25/25 0927    Specimen: Blood from Hand, Left Updated: 03/25/25 1004     Lipase 23 U/L     Urinalysis With Microscopic If Indicated (No Culture) - Urine, Clean Catch [[id number]]  (Abnormal) Collected: 03/25/25 0929    Specimen: Urine, Clean Catch Updated: 03/25/25 0945     Color, UA Yellow     Appearance, UA Clear     pH, UA 7.0     Specific Gravity, UA >1.030     Glucose, UA >=1000 mg/dL (3+)     Ketones, UA Negative     Bilirubin, UA Negative     Blood, UA Negative     Protein, UA Trace     Leuk Esterase, UA Negative     Nitrite, UA Negative     Urobilinogen, UA 0.2 E.U./dL    Narrative:      Urine  microscopic not indicated.             Imaging:    CT Abdomen Pelvis With Contrast  Result Date: 3/25/2025  CT ABDOMEN PELVIS W CONTRAST Date of Exam: 3/25/2025 10:22 AM EDT Indication: abdominal pain. Comparison: None available. Technique: Axial CT images were obtained of the abdomen and pelvis after the uneventful intravenous administration of iodinated contrast. Reconstructed coronal and sagittal images were also obtained. Automated exposure control and iterative construction methods were used. Findings: Liver: The liver is unremarkable in morphology. No focal liver lesion is seen. No biliary dilation is seen. Gallbladder: Cholelithiasis. The gallbladder is mostly decompressed. Pancreas: Unremarkable. Spleen: Unremarkable. Adrenal glands: Unremarkable. Genitourinary tract: Kidneys are unremarkable. No hydronephrosis is seen. The visualized portions of the ureters and urinary bladder appear unremarkable. Prostate gland is unremarkable. Gastrointestinal tract: Multiple mildly dilated small bowel loops within the left abdomen measuring up to 3.8 cm in caliber and with scattered air-fluid levels. Distal small bowel is decompressed. There is moderate fluid throughout most of the colon. Findings may be related to ileus, enteritis, or partial/early small bowel obstruction. Colonic diverticulosis is present. Appendix: No findings to suggest acute appendicitis. Other findings: No free air or free fluid is identified. No pathologically enlarged lymph nodes are seen. Vascular calcifications are present. There is moderate stenosis within the superior mesenteric artery. The IVC is unremarkable. Bones and soft tissues: No acute or suspicious osseous or soft tissue lesion is identified. Lung bases: The visualized lung bases are clear.     Impression: 1.Multiple mildly dilated small bowel loops within the left abdomen measuring up to 3.8 cm in caliber and with scattered air-fluid levels. Distal small bowel is decompressed. There  is moderate fluid throughout most of the colon. Findings may be related to ileus, enteritis, or partial/early small bowel obstruction. 2.Colonic diverticulosis. 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 4.Additional findings as detailed above. Electronically Signed: Oh Lynn MD  3/25/2025 10:46 AM EDT  Workstation ID: [id number]        Differential Diagnosis and Discussion:    Abdominal Pain: Based on the patient's signs and symptoms, I considered abdominal aortic aneurysm, small bowel obstruction, pancreatitis, acute cholecystitis, acute appendecitis, peptic ulcer disease, gastritis, colitis, endocrine disorders, irritable bowel syndrome and other differential diagnosis an etiology of the patient's abdominal pain.    PROCEDURES:    Labs were collected in the emergency department and all labs were reviewed and interpreted by me.  CT scan was performed in the emergency department and the CT scan radiology impression was interpreted by me.    No orders to display       Procedures    MDM     Amount and/or Complexity of Data Reviewed  Clinical lab tests: reviewed  Tests in the radiology section of CPT®: reviewed       Patient is a 65-year-old gentleman who presents with complaints of nausea, vomiting, abdominal pain.  Had diffuse abdominal pain when he arrived here in the emergency room.  CT was done which showed enteritis versus small bowel obstruction.  He then started having multiple bowel movements here in the emergency room.  This does not appear to be consistent with a small bowel obstruction.  Surgery was consulted who stated that this did not look like a small bowel obstruction either.  He has had significant improvement in his belly pain as well as now is tolerating p.o.  At this time recommend continue to push p.o. fluids at home.  Will likely continue to have diarrhea.  Will DC and have the patient follow-up as outpatient.                Patient Care  Considerations:          Consultants/Shared Management Plan:    Consultant: I have discussed the case with Dr. Moreno who states states this appears to be more enteritis/constipation and not small bowel obstruction.    Social Determinants of Health:    Patient is independent, reliable, and has access to care.       Disposition and Care Coordination:    Discharged: The patient is suitable and stable for discharge with no need for consideration of admission.    I have explained the patient´s condition, diagnoses and treatment plan based on the information available to me at this time. I have answered questions and addressed any concerns. The patient has a good  understanding of the patient´s diagnosis, condition, and treatment plan as can be expected at this point. The vital signs have been stable. The patient´s condition is stable and appropriate for discharge from the emergency department.      The patient will pursue further outpatient evaluation with the primary care physician or other designated or consulting physician as outlined in the discharge instructions. They are agreeable to this plan of care and follow-up instructions have been explained in detail. The patient has received these instructions in written format and have expressed an understanding of the discharge instructions. The patient is aware that any significant change in condition or worsening of symptoms should prompt an immediate return to this or the closest emergency department or call to 911.      Final diagnoses:   Generalized abdominal pain   Enteritis        ED Disposition       ED Disposition   Discharge    Condition   Stable    Comment   --               This medical record created using voice recognition software.             Trace Jaramillo MD  03/25/25 [id number]

## 2025-05-21 ENCOUNTER — OFFICE VISIT (OUTPATIENT)
Dept: FAMILY MEDICINE CLINIC | Facility: CLINIC | Age: 65
End: 2025-05-21
Payer: MEDICARE

## 2025-05-21 VITALS
DIASTOLIC BLOOD PRESSURE: 72 MMHG | SYSTOLIC BLOOD PRESSURE: 130 MMHG | WEIGHT: 271 LBS | OXYGEN SATURATION: 96 % | HEIGHT: 72 IN | HEART RATE: 91 BPM | TEMPERATURE: 97.1 F | BODY MASS INDEX: 36.7 KG/M2

## 2025-05-21 DIAGNOSIS — Z79.4 TYPE 2 DIABETES MELLITUS WITHOUT COMPLICATION, WITH LONG-TERM CURRENT USE OF INSULIN: Primary | ICD-10-CM

## 2025-05-21 DIAGNOSIS — E11.9 TYPE 2 DIABETES MELLITUS WITHOUT COMPLICATION, WITH LONG-TERM CURRENT USE OF INSULIN: Primary | ICD-10-CM

## 2025-05-21 DIAGNOSIS — G47.09 OTHER INSOMNIA: ICD-10-CM

## 2025-05-21 DIAGNOSIS — E78.1 HYPERTRIGLYCERIDEMIA: ICD-10-CM

## 2025-05-21 DIAGNOSIS — Z87.891 FORMER CIGARETTE SMOKER: ICD-10-CM

## 2025-05-21 DIAGNOSIS — R35.1 NOCTURIA: ICD-10-CM

## 2025-05-21 DIAGNOSIS — E11.9 COMPREHENSIVE DIABETIC FOOT EXAMINATION, TYPE 2 DM, ENCOUNTER FOR: ICD-10-CM

## 2025-05-21 DIAGNOSIS — I10 HTN (HYPERTENSION), BENIGN: ICD-10-CM

## 2025-05-21 DIAGNOSIS — M65.331 TRIGGER MIDDLE FINGER OF RIGHT HAND: ICD-10-CM

## 2025-05-21 RX ORDER — FENOFIBRATE 145 MG/1
145 TABLET, FILM COATED ORAL DAILY
Qty: 90 TABLET | Refills: 1 | Status: SHIPPED | OUTPATIENT
Start: 2025-05-21

## 2025-05-21 RX ORDER — LISINOPRIL 40 MG/1
40 TABLET ORAL DAILY
Qty: 90 TABLET | Refills: 1 | Status: SHIPPED | OUTPATIENT
Start: 2025-05-21

## 2025-05-21 RX ORDER — EMPAGLIFLOZIN AND LINAGLIPTIN 25; 5 MG/1; MG/1
1 TABLET, FILM COATED ORAL DAILY
Qty: 90 TABLET | Refills: 1 | Status: SHIPPED | OUTPATIENT
Start: 2025-05-21

## 2025-05-21 RX ORDER — BUPROPION HYDROCHLORIDE 150 MG/1
150 TABLET, EXTENDED RELEASE ORAL 2 TIMES DAILY
Qty: 180 TABLET | Refills: 1 | Status: SHIPPED | OUTPATIENT
Start: 2025-05-21

## 2025-05-21 RX ORDER — TRAZODONE HYDROCHLORIDE 100 MG/1
100 TABLET ORAL
Qty: 90 TABLET | Refills: 1 | Status: SHIPPED | OUTPATIENT
Start: 2025-05-21

## 2025-05-21 RX ORDER — AMLODIPINE BESYLATE 5 MG/1
5 TABLET ORAL DAILY
Qty: 90 TABLET | Refills: 1 | Status: SHIPPED | OUTPATIENT
Start: 2025-05-21

## 2025-05-21 RX ORDER — INSULIN DEGLUDEC 200 U/ML
170 INJECTION, SOLUTION SUBCUTANEOUS NIGHTLY
Qty: 77 ML | Refills: 1 | Status: SHIPPED | OUTPATIENT
Start: 2025-05-21

## 2025-05-21 RX ORDER — TAMSULOSIN HYDROCHLORIDE 0.4 MG/1
0.4 CAPSULE ORAL EVERY EVENING
Qty: 90 CAPSULE | Refills: 1 | Status: SHIPPED | OUTPATIENT
Start: 2025-05-21

## 2025-05-21 NOTE — PROGRESS NOTES
Chief Complaint  Diabetes (Medication refills. Need for diabetic eye. )    Subjective            BRANDON Gaetano GARCIA presents to [facility]  Diabetes  Associated symptoms:     no blurred vision, no chest pain, no fatigue, no polydipsia, no polyphagia, no polyuria and no weight loss    Hypoglycemia symptoms:     no dizziness, is not nervous/anxious, no seizures, no sleepiness, no speech difficulty, no sweats and no tremors    Hypoglycemia complications:     no blackouts, no hospitalization, no nocturnal hypoglycemia, no required assistance and no required glucagon injection        History of Present Illness  The patient is a 65-year-old male who presents today for follow-up and medication refills on chronic comorbid conditions managed from the primary care standpoint.    He reports no fatigue, dysphagia, blurred vision, or diplopia. He experiences frequent urination due to high fluid intake but does not report nocturia. He also does not experience shortness of breath, chest pain, abdominal pain, hematochezia, dizziness, lightheadedness, seizures, syncope, easy bruising or bleeding, suicidal ideation, or self-injurious behavior.     Type 2 diabetes insulin-dependent: Tolerating medications well no side effects no issues reported only checks glucoses periodically no hypoglycemic episodes and was on 160 units daily but patient reports she has been doing 170 units daily since stopping the metformin as he was having side effects-he has not been monitoring his blood glucose levels and expresses disinterest in using Dexcom. He reports no episodes of blackouts, fainting, shakiness, or excessive sweating. He is seeking an A1c test today as he has discontinued metformin and is attempting to regain control over his blood glucose levels. He did not fast prior to today's visit. He recently underwent an eye examination in 03/2025. He acknowledges the need for a diabetic foot exam every 6 months and  admits to neglecting proper foot care, including nail trimming. He is currently on Tresiba 200 units/mL at a dose of 170 units daily.    He has been experiencing persistent right knee pain for several years, which was previously evaluated by an orthopedist in [place]. The orthopedist attributed the pain to a tendon issue rather than arthritis, as confirmed by an x-ray.    He has a history of trigger finger in his right hand's middle finger, which he managed by ceasing its use. Although the trigger finger has resolved, he now experiences limited mobility in the finger, which loosens slightly throughout the day but prevents him from making a fist. He believes a cortisone injection may be beneficial, as he has previously received such injections in his shoulders with positive results.    He is on trazodone and uses CPAP for sleep apnea. He reports good sleep quality and does not feel the need to take naps frequently-for the insomnia tolerating medication well no side effects no issues reported no SI/HI overall stable.    Hypertension: Tolerating medication well no side effects no issues reported no chest pain shortness of breath syncopal episodes dizziness or lightheadedness overall stable he is on amlodipine for hypertension.    He is on Wellbutrin for smoking cessation tolerating medication well no side effects no issues reported no SI/HI overall stable.    He is on fenofibrate for hyperlipidemia tolerating medication well no side effects no issues reported no myalgias reported overall stable    He is on Flomax for benign prostatic hyperplasia tolerating medication well no side effects no issues reported and now sleeps through the night receiving very good efficacy.      PHQ-2 Total Score: 0    PHQ-9 Total Score:        Past Medical History:   Diagnosis Date    Cataract     Colon polyp     Diabetes mellitus     Foot pain, bilateral     HL (hearing loss)     Hyperlipidemia     Hypertension        Allergies   Allergen  Reactions    Metformin Diarrhea        Past Surgical History:   Procedure Laterality Date    CLAVICLE SURGERY Right         Social History     Tobacco Use    Smoking status: Former     Current packs/day: 0.00     Average packs/day: 1 pack/day for 45.0 years (45.0 ttl pk-yrs)     Types: Cigarettes     Start date:      Quit date:      Years since quittin.3     Passive exposure: Past    Smokeless tobacco: Never   Vaping Use    Vaping status: Never Used   Substance Use Topics    Alcohol use: Not Currently    Drug use: Not Currently     Types: Marijuana       Family History   Problem Relation Age of Onset    Heart disease Mother     Cancer Mother     Hyperlipidemia Father     Diabetes Father     COPD Father     Hypertension Father     Sleep apnea Father     Restless legs syndrome Father     Other Brother     Colon cancer Neg Hx         Health Maintenance Due   Topic Date Due    URINE MICROALBUMIN-CREATININE RATIO (uACR)  2024    DIABETIC EYE EXAM  2025    HEMOGLOBIN A1C  2025        Current Outpatient Medications on File Prior to Visit   Medication Sig    Accu-Chek FastClix Lancets misc Use 1 each 4 (Four) Times a Day Before Meals & at Bedtime.    BD Pen Needle Mony 2nd Gen 32G X 4 MM misc USE FOR INJECTIONS EVERY NIGHT AS DIRECTED    Blood Glucose Monitoring Suppl (FreeStyle Lite) w/Device kit USE TO TEST FOUR TIMES DAILY BEFORE MEALS AND AT BEDTIME    glucose blood (Accu-Chek Thi Plus) test strip 1 each by Other route 4 (Four) Times a Day Before Meals & at Bedtime. Use as instructed    ondansetron ODT (ZOFRAN-ODT) 4 MG disintegrating tablet Place 1 tablet on the tongue 4 (Four) Times a Day As Needed for Vomiting or Nausea.    [DISCONTINUED] amLODIPine (NORVASC) 5 MG tablet Take 1 tablet by mouth Daily.    [DISCONTINUED] buPROPion SR (WELLBUTRIN SR) 150 MG 12 hr tablet Take 1 tablet by mouth 2 (Two) Times a Day.    [DISCONTINUED] fenofibrate (TRICOR) 145 MG tablet Take 1 tablet by mouth  Daily.    [DISCONTINUED] Glyxambi 25-5 MG tablet Take 1 tablet by mouth Daily.    [DISCONTINUED] lisinopril (PRINIVIL,ZESTRIL) 40 MG tablet Take 1 tablet by mouth Daily.    [DISCONTINUED] tamsulosin (FLOMAX) 0.4 MG capsule 24 hr capsule TAKE 1 CAPSULE BY MOUTH EVERY EVENING    [DISCONTINUED] traZODone (DESYREL) 100 MG tablet Take 1 tablet by mouth every night at bedtime.    [DISCONTINUED] Tresiba FlexTouch 200 UNIT/ML solution pen-injector pen injection Inject 160 Units under the skin into the appropriate area as directed Every Night.     No current facility-administered medications on file prior to visit.       Immunization History   Administered Date(s) Administered    COVID-19 (PFIZER) Purple Cap Monovalent 03/08/2021, 03/29/2021, 01/13/2022    Fluzone (or Fluarix & Flulaval for VFC) >6mos 10/20/2014, 09/10/2020, 10/04/2020, 10/27/2021, 10/26/2022    Fluzone High-Dose 65+YRS 10/23/2024    Fluzone High-Dose 65+yrs 11/01/2023    Influenza Seasonal Injectable 11/06/2016, 10/04/2020    Pneumococcal Conjugate 20-Valent (PCV20) 04/26/2023    Pneumococcal Polysaccharide (PPSV23) 08/13/2016, 10/24/2016    Shingrix 09/10/2019, 01/15/2020    Tdap 04/08/2020       Review of Systems   Constitutional:  Negative for fatigue, unexpected weight gain and unexpected weight loss.   HENT:  Negative for trouble swallowing.    Eyes:  Negative for blurred vision and double vision.   Respiratory:  Negative for shortness of breath.    Cardiovascular:  Negative for chest pain.   Gastrointestinal:  Negative for abdominal pain and blood in stool.   Endocrine: Negative for polydipsia, polyphagia and polyuria.   Genitourinary:  Positive for frequency. Negative for nocturia.   Musculoskeletal:  Positive for arthralgias.        Knee right sided for yrs    Neurological:  Negative for dizziness, tremors, seizures, syncope, speech difficulty and light-headedness.   Hematological:  Does not bruise/bleed easily.   Psychiatric/Behavioral:  Negative  "for self-injury and suicidal ideas. The patient is not nervous/anxious.         Objective     /72   Pulse 91   Temp 97.1 °F (36.2 °C) (Temporal)   Ht 181.6 cm (71.5\")   Wt 123 kg (271 lb)   SpO2 96%   BMI 37.27 kg/m²       Physical Exam  Vitals and nursing note reviewed.   Constitutional:       Appearance: Normal appearance.   HENT:      Head: Normocephalic.      Right Ear: External ear normal.      Left Ear: External ear normal.      Nose: Nose normal.      Mouth/Throat:      Mouth: Mucous membranes are moist.   Eyes:      Pupils: Pupils are equal, round, and reactive to light.   Cardiovascular:      Rate and Rhythm: Normal rate and regular rhythm.      Pulses:           Dorsalis pedis pulses are 2+ on the right side and 2+ on the left side.        Posterior tibial pulses are 2+ on the right side and 2+ on the left side.      Heart sounds: Normal heart sounds.   Pulmonary:      Effort: Pulmonary effort is normal.      Breath sounds: Normal breath sounds.   Abdominal:      General: Bowel sounds are normal.      Palpations: Abdomen is soft.      Tenderness: There is no abdominal tenderness.   Musculoskeletal:         General: Normal range of motion.      Cervical back: Normal range of motion and neck supple.      Right foot: No deformity or bunion.      Left foot: No deformity or bunion.        Feet:       Comments: Right hand middle finger limited ROM and prior trigger finger    Feet:      Right foot:      Protective Sensation: 10 sites tested.  5 sites sensed.      Skin integrity: Skin integrity normal. No ulcer or blister.      Toenail Condition: Right toenails are abnormally thick and long.      Left foot:      Protective Sensation: 10 sites tested.  5 sites sensed.      Skin integrity: Skin integrity normal. No ulcer or blister.      Toenail Condition: Left toenails are abnormally thick and long.      Comments: Diabetic Foot Exam Performed and Monofilament Test Performed     Skin:     General: Skin " is warm and dry.   Neurological:      Mental Status: He is alert and oriented to person, place, and time.   Psychiatric:         Mood and Affect: Mood normal.         Behavior: Behavior normal.         Thought Content: Thought content normal.         Judgment: Judgment normal.         Result Review :                    Results                 Assessment and Plan      Diagnoses and all orders for this visit:    1. Type 2 diabetes mellitus without complication, with long-term current use of insulin (Primary)  -     Microalbumin / Creatinine Urine Ratio - Urine, Clean Catch  -     Glyxambi 25-5 MG tablet; Take 1 tablet by mouth Daily.  Dispense: 90 tablet; Refill: 1  -     Tresiba FlexTouch 200 UNIT/ML solution pen-injector pen injection; Inject 170 Units under the skin into the appropriate area as directed Every Night.  Dispense: 77 mL; Refill: 1  -     Urinalysis With Culture If Indicated -  -     CBC & Differential  -     Comprehensive Metabolic Panel  -     Hemoglobin A1c  -     Lipid Panel  -     TSH Rfx On Abnormal To Free T4    2. HTN (hypertension), benign  -     amLODIPine (NORVASC) 5 MG tablet; Take 1 tablet by mouth Daily.  Dispense: 90 tablet; Refill: 1  -     lisinopril (PRINIVIL,ZESTRIL) 40 MG tablet; Take 1 tablet by mouth Daily.  Dispense: 90 tablet; Refill: 1  -     Urinalysis With Culture If Indicated -  -     CBC & Differential  -     Comprehensive Metabolic Panel  -     Lipid Panel  -     TSH Rfx On Abnormal To Free T4    3. Hypertriglyceridemia  -     fenofibrate (TRICOR) 145 MG tablet; Take 1 tablet by mouth Daily.  Dispense: 90 tablet; Refill: 1  -     Comprehensive Metabolic Panel  -     Lipid Panel    4. Former cigarette smoker  -     buPROPion SR (WELLBUTRIN SR) 150 MG 12 hr tablet; Take 1 tablet by mouth 2 (Two) Times a Day.  Dispense: 180 tablet; Refill: 1    5. Other insomnia  -     traZODone (DESYREL) 100 MG tablet; Take 1 tablet by mouth every night at bedtime.  Dispense: 90 tablet;  Refill: 1  -     Comprehensive Metabolic Panel    6. Nocturia  -     tamsulosin (FLOMAX) 0.4 MG capsule 24 hr capsule; Take 1 capsule by mouth Every Evening.  Dispense: 90 capsule; Refill: 1  -     Urinalysis With Culture If Indicated -    7. Trigger middle finger of right hand  -     Ambulatory Referral to Orthopedic Surgery    8. Comprehensive diabetic foot examination, type 2 DM, encounter for    Medications refilled as noted above and labs ordered as noted above and patient will stop back by fasting tomorrow    And then referral to orthopedics for the trigger finger and limited range of motion and patient is very interested in a cortisone shot      Assessment & Plan  1. Diabetes mellitus.  - Currently on Tresiba 200 units/mL at a dose of 170 units daily.  - Will return to the office tomorrow for fasting labs, including an A1c test.  - Decreased sensation in feet and thick toenails noted during diabetic foot exam.  - Referral to a podiatrist will be made; advised to trim toenails regularly to prevent complications.    2. Hypertension.  - Blood pressure today is 130/72.  - Prescription for amlodipine will be sent to pharmacy.    3. Depression.  - No issues reported with current medication regimen.  - Prescription for Wellbutrin will be sent to pharmacy.    4. Hyperlipidemia.  - Prescription for fenofibrate will be sent to pharmacy.    5. Benign prostatic hyperplasia.  - Reports that Flomax is effective in managing symptoms.  - Prescription for Flomax will be sent to pharmacy.    6. Insomnia.  - Significant improvement in sleep quality with CPAP and trazodone.  - Prescription for trazodone will be sent to pharmacy.    7. Right knee pain.  - Chronic right knee pain attributed to a tendon issue.  - No new treatment planned at this time.    8. Trigger finger.  - History of trigger finger in right hand's middle finger with limited range of motion.  - Referral to an orthopedic hand specialist will be made for a  potential cortisone injection.          Follow Up     Return in about 6 months (around 11/21/2025), or if symptoms worsen or fail to improve, for Medicare Wellness, Recheck, fasting labs and refills.    Patient was given instructions and counseling regarding his condition or for health maintenance advice. Please see specific information pulled into the AVS if appropriate.            BRANDON Salter II  reports that he quit smoking about 5 years ago. His smoking use included cigarettes. He started smoking about 50 years ago. He has a 45 pack-year smoking history. He has been exposed to tobacco smoke. He has never used smokeless tobacco. I have educated him on the risk of diseases from using tobacco products such as cancer, COPD, and heart disease.         Patient or patient representative verbalized consent for the use of Ambient Listening during the visit with  CHAZ Jacques for chart documentation. 5/21/2025  08:20 EDT

## 2025-05-22 ENCOUNTER — CLINICAL SUPPORT (OUTPATIENT)
Dept: FAMILY MEDICINE CLINIC | Facility: CLINIC | Age: 65
End: 2025-05-22
Payer: MEDICARE

## 2025-05-22 DIAGNOSIS — E11.9 TYPE 2 DIABETES MELLITUS WITHOUT COMPLICATION, WITH LONG-TERM CURRENT USE OF INSULIN: Primary | ICD-10-CM

## 2025-05-22 DIAGNOSIS — I10 HTN (HYPERTENSION), BENIGN: ICD-10-CM

## 2025-05-22 DIAGNOSIS — Z79.4 TYPE 2 DIABETES MELLITUS WITHOUT COMPLICATION, WITH LONG-TERM CURRENT USE OF INSULIN: Primary | ICD-10-CM

## 2025-05-22 LAB
ALBUMIN SERPL-MCNC: 4.2 G/DL (ref 3.5–5.2)
ALBUMIN UR-MCNC: <1.2 MG/DL
ALBUMIN/GLOB SERPL: 1.3 G/DL
ALP SERPL-CCNC: 57 U/L (ref 39–117)
ALT SERPL W P-5'-P-CCNC: 14 U/L (ref 1–41)
ANION GAP SERPL CALCULATED.3IONS-SCNC: 10.8 MMOL/L (ref 5–15)
AST SERPL-CCNC: 12 U/L (ref 1–40)
BASOPHILS # BLD AUTO: 0.04 10*3/MM3 (ref 0–0.2)
BASOPHILS NFR BLD AUTO: 0.5 % (ref 0–1.5)
BILIRUB SERPL-MCNC: <0.2 MG/DL (ref 0–1.2)
BILIRUB UR QL STRIP: NEGATIVE
BUN SERPL-MCNC: 20 MG/DL (ref 8–23)
BUN/CREAT SERPL: 17.4 (ref 7–25)
CALCIUM SPEC-SCNC: 9.3 MG/DL (ref 8.6–10.5)
CHLORIDE SERPL-SCNC: 104 MMOL/L (ref 98–107)
CHOLEST SERPL-MCNC: 144 MG/DL (ref 0–200)
CLARITY UR: CLEAR
CO2 SERPL-SCNC: 24.2 MMOL/L (ref 22–29)
COLOR UR: YELLOW
CREAT SERPL-MCNC: 1.15 MG/DL (ref 0.76–1.27)
CREAT UR-MCNC: 62.1 MG/DL
DEPRECATED RDW RBC AUTO: 42.7 FL (ref 37–54)
EGFRCR SERPLBLD CKD-EPI 2021: 69.8 ML/MIN/1.73
EOSINOPHIL # BLD AUTO: 0.25 10*3/MM3 (ref 0–0.4)
EOSINOPHIL NFR BLD AUTO: 3 % (ref 0.3–6.2)
ERYTHROCYTE [DISTWIDTH] IN BLOOD BY AUTOMATED COUNT: 14.6 % (ref 12.3–15.4)
GLOBULIN UR ELPH-MCNC: 3.2 GM/DL
GLUCOSE SERPL-MCNC: 177 MG/DL (ref 65–99)
GLUCOSE UR STRIP-MCNC: ABNORMAL MG/DL
HBA1C MFR BLD: 7.8 % (ref 4.8–5.6)
HCT VFR BLD AUTO: 43.1 % (ref 37.5–51)
HDLC SERPL-MCNC: 26 MG/DL (ref 40–60)
HGB BLD-MCNC: 14.2 G/DL (ref 13–17.7)
HGB UR QL STRIP.AUTO: NEGATIVE
HOLD SPECIMEN: NORMAL
IMM GRANULOCYTES # BLD AUTO: 0.04 10*3/MM3 (ref 0–0.05)
IMM GRANULOCYTES NFR BLD AUTO: 0.5 % (ref 0–0.5)
KETONES UR QL STRIP: NEGATIVE
LDLC SERPL CALC-MCNC: 71 MG/DL (ref 0–100)
LDLC/HDLC SERPL: 2.29 {RATIO}
LEUKOCYTE ESTERASE UR QL STRIP.AUTO: NEGATIVE
LYMPHOCYTES # BLD AUTO: 2.77 10*3/MM3 (ref 0.7–3.1)
LYMPHOCYTES NFR BLD AUTO: 33.1 % (ref 19.6–45.3)
MCH RBC QN AUTO: 26.8 PG (ref 26.6–33)
MCHC RBC AUTO-ENTMCNC: 32.9 G/DL (ref 31.5–35.7)
MCV RBC AUTO: 81.5 FL (ref 79–97)
MICROALBUMIN/CREAT UR: NORMAL MG/G{CREAT}
MONOCYTES # BLD AUTO: 0.77 10*3/MM3 (ref 0.1–0.9)
MONOCYTES NFR BLD AUTO: 9.2 % (ref 5–12)
NEUTROPHILS NFR BLD AUTO: 4.51 10*3/MM3 (ref 1.7–7)
NEUTROPHILS NFR BLD AUTO: 53.7 % (ref 42.7–76)
NITRITE UR QL STRIP: NEGATIVE
NRBC BLD AUTO-RTO: 0 /100 WBC (ref 0–0.2)
PH UR STRIP.AUTO: 6.5 [PH] (ref 5–8)
PLATELET # BLD AUTO: 337 10*3/MM3 (ref 140–450)
PMV BLD AUTO: 10.6 FL (ref 6–12)
POTASSIUM SERPL-SCNC: 4.4 MMOL/L (ref 3.5–5.2)
PROT SERPL-MCNC: 7.4 G/DL (ref 6–8.5)
PROT UR QL STRIP: NEGATIVE
RBC # BLD AUTO: 5.29 10*6/MM3 (ref 4.14–5.8)
SODIUM SERPL-SCNC: 139 MMOL/L (ref 136–145)
SP GR UR STRIP: 1.03 (ref 1–1.03)
TRIGL SERPL-MCNC: 292 MG/DL (ref 0–150)
TSH SERPL DL<=0.05 MIU/L-ACNC: 0.67 UIU/ML (ref 0.27–4.2)
UROBILINOGEN UR QL STRIP: ABNORMAL
VLDLC SERPL-MCNC: 47 MG/DL (ref 5–40)
WBC NRBC COR # BLD AUTO: 8.38 10*3/MM3 (ref 3.4–10.8)

## 2025-05-22 PROCEDURE — 82570 ASSAY OF URINE CREATININE: CPT | Performed by: NURSE PRACTITIONER

## 2025-05-22 PROCEDURE — 80061 LIPID PANEL: CPT | Performed by: NURSE PRACTITIONER

## 2025-05-22 PROCEDURE — 82043 UR ALBUMIN QUANTITATIVE: CPT | Performed by: NURSE PRACTITIONER

## 2025-05-22 PROCEDURE — 81003 URINALYSIS AUTO W/O SCOPE: CPT | Performed by: NURSE PRACTITIONER

## 2025-05-22 PROCEDURE — 84443 ASSAY THYROID STIM HORMONE: CPT | Performed by: NURSE PRACTITIONER

## 2025-05-22 PROCEDURE — 80053 COMPREHEN METABOLIC PANEL: CPT | Performed by: NURSE PRACTITIONER

## 2025-05-22 PROCEDURE — 85025 COMPLETE CBC W/AUTO DIFF WBC: CPT | Performed by: NURSE PRACTITIONER

## 2025-05-22 PROCEDURE — 83036 HEMOGLOBIN GLYCOSYLATED A1C: CPT | Performed by: NURSE PRACTITIONER

## 2025-05-22 NOTE — PROGRESS NOTES
..  Venipuncture Blood Specimen Collection  Venipuncture performed in LT arm by Felecia Echevarria MA with good hemostasis. Patient tolerated the procedure well without complications.   05/22/25   Felecia Echevarria MA

## 2025-05-23 DIAGNOSIS — G47.09 OTHER INSOMNIA: ICD-10-CM

## 2025-05-23 DIAGNOSIS — I10 HTN (HYPERTENSION), BENIGN: ICD-10-CM

## 2025-05-23 RX ORDER — AMLODIPINE BESYLATE 5 MG/1
5 TABLET ORAL DAILY
Qty: 90 TABLET | Refills: 1 | OUTPATIENT
Start: 2025-05-23

## 2025-05-23 RX ORDER — TRAZODONE HYDROCHLORIDE 100 MG/1
100 TABLET ORAL
Qty: 90 TABLET | Refills: 1 | OUTPATIENT
Start: 2025-05-23

## 2025-06-23 ENCOUNTER — OFFICE VISIT (OUTPATIENT)
Dept: ORTHOPEDIC SURGERY | Facility: CLINIC | Age: 65
End: 2025-06-23
Payer: MEDICARE

## 2025-06-23 VITALS
HEART RATE: 86 BPM | BODY MASS INDEX: 36.57 KG/M2 | WEIGHT: 270 LBS | DIASTOLIC BLOOD PRESSURE: 88 MMHG | OXYGEN SATURATION: 94 % | HEIGHT: 72 IN | SYSTOLIC BLOOD PRESSURE: 125 MMHG

## 2025-06-23 DIAGNOSIS — M65.331 TRIGGER MIDDLE FINGER OF RIGHT HAND: Primary | ICD-10-CM

## 2025-06-23 PROCEDURE — 3079F DIAST BP 80-89 MM HG: CPT | Performed by: ORTHOPAEDIC SURGERY

## 2025-06-23 PROCEDURE — 99203 OFFICE O/P NEW LOW 30 MIN: CPT | Performed by: ORTHOPAEDIC SURGERY

## 2025-06-23 PROCEDURE — 3074F SYST BP LT 130 MM HG: CPT | Performed by: ORTHOPAEDIC SURGERY

## 2025-06-23 PROCEDURE — 20550 NJX 1 TENDON SHEATH/LIGAMENT: CPT | Performed by: ORTHOPAEDIC SURGERY

## 2025-06-23 RX ORDER — LIDOCAINE HYDROCHLORIDE 10 MG/ML
1 INJECTION, SOLUTION INFILTRATION; PERINEURAL
Status: COMPLETED | OUTPATIENT
Start: 2025-06-23 | End: 2025-06-23

## 2025-06-23 RX ORDER — TRIAMCINOLONE ACETONIDE 40 MG/ML
40 INJECTION, SUSPENSION INTRA-ARTICULAR; INTRAMUSCULAR
Status: COMPLETED | OUTPATIENT
Start: 2025-06-23 | End: 2025-06-23

## 2025-06-23 RX ADMIN — LIDOCAINE HYDROCHLORIDE 1 ML: 10 INJECTION, SOLUTION INFILTRATION; PERINEURAL at 13:30

## 2025-06-23 RX ADMIN — TRIAMCINOLONE ACETONIDE 40 MG: 40 INJECTION, SUSPENSION INTRA-ARTICULAR; INTRAMUSCULAR at 13:30

## 2025-06-23 NOTE — PROGRESS NOTES
"Chief Complaint  Initial Evaluation of the Right Hand     Subjective      BRANDON Salter II presents to [facility] for initial evaluation of the right hand. He had a history of trigger finger.  He has had decrease ROM of the right had 3 rd digit for over a year.  He saw an ortho where he was living and noted he stop using that finger for awhile.  He has pain and limited ROM of the 3 rd digit at the PIP joint.      Allergies   Allergen Reactions    Metformin Diarrhea        Social History     Socioeconomic History    Marital status:    Tobacco Use    Smoking status: Former     Current packs/day: 0.00     Average packs/day: 1 pack/day for 45.0 years (45.0 ttl pk-yrs)     Types: Cigarettes     Start date:      Quit date:      Years since quittin.4     Passive exposure: Past    Smokeless tobacco: Never   Vaping Use    Vaping status: Never Used   Substance and Sexual Activity    Alcohol use: Not Currently    Drug use: Not Currently     Types: Marijuana    Sexual activity: Yes     Partners: Female        I reviewed the patient's chief complaint, history of present illness, review of systems, past medical history, surgical history, family history, social history, medications, and allergy list.     Review of Systems     Constitutional: Denies fevers, chills, weight loss  Cardiovascular: Denies chest pain, shortness of breath  Skin: Denies rashes, acute skin changes  Neurologic: Denies headache, loss of consciousness        Vital Signs:   /88   Pulse 86   Ht 181.6 cm (71.5\")   Wt 122 kg (270 lb)   SpO2 94%   BMI 37.13 kg/m²          Physical Exam  General: Alert. No acute distress    Ortho Exam        RIGHT HAND Negative Compression testing/ Negative Tinels. NegativeFinkelsteins. Negative Laws's testing. Negative [facility] grind testing. Negative Phalens. Limited ROM of the right hand third digit.   Positive Triggering of the digit. Sensation grossly intact to light touch, " median, radial and ulnar nerve. Positive AIN, PIN and ulnar nerve motor function intact. Axillary nerve intact. Positive pulses.  He has extrinsic tightness of the 2 nd and 3 rd digits.  Tender over the A1 pulley.        Right 3rd Trigger Finger Injection  Consent given by: patient  Site marked: site marked  Timeout: Immediately prior to procedure a time out was called to verify the correct patient, procedure, equipment, support staff and site/side marked as required   Supporting Documentation  Indications: pain   Procedure Details  Location: -   Location: Right 3rd Trigger Finger Injection.Preparation: Patient was prepped and draped in the usual sterile fashion  Needle gauge: 23G.  Medications administered: 1 mL lidocaine 1 %; 40 mg triamcinolone acetonide 40 MG/ML  Patient tolerance: patient tolerated the procedure well with no immediate complications      This injection documentation was Scribed for Servando Zamora MD by Chavo Wray.  06/23/25   14:16 EDT      Imaging Results (Most Recent)       None             Result Review :           Assessment and Plan     Diagnoses and all orders for this visit:    1. Trigger middle finger of right hand (Primary)        Discussed the treatment plan with the patient.     Discussed the risks and benefits of conservative measures.  The patient expressed understanding and wished to proceed with a right hand 3 rd digit trigger finger injection.  He tolerated the injection well.     Discussed with the patient that due to the steroid injection given today in the office they may see an increase in blood sugar for a few days. Advised patient to monitor sugar after receiving the injection.     Discussed possibility of a reaction from the injection.  Discussed the possibility that the injection may not completely improve or remove the pain.  Discussed the risk of infection.    Prescribed physical therapy.     Discussed the treatment options with the patient, operative vs non-operative.    The patient discussed trigger finger release with stretching of the 2 nd and 3rd digit with surgery.     Call or return if worsening symptoms.    Follow Up     PRN      Patient was given instructions and counseling regarding his condition or for health maintenance advice. Please see specific information pulled into the AVS if appropriate.     Scribed for Servando Zamora MD by Juliette Bose MA.  06/23/25   13:46 EDT    I have personally performed the services described in this document as scribed by the above individual and it is both accurate and complete. Servando Zamora MD 06/23/25

## 2025-06-24 ENCOUNTER — PATIENT ROUNDING (BHMG ONLY) (OUTPATIENT)
Dept: ORTHOPEDIC SURGERY | Facility: CLINIC | Age: 65
End: 2025-06-24
Payer: MEDICARE

## 2025-06-24 NOTE — PROGRESS NOTES
A [employer] message has been sent to the patient for PATIENT [place] with [facility].

## 2025-07-08 ENCOUNTER — TELEPHONE (OUTPATIENT)
Dept: FAMILY MEDICINE CLINIC | Facility: CLINIC | Age: 65
End: 2025-07-08

## 2025-07-08 DIAGNOSIS — Z79.4 TYPE 2 DIABETES MELLITUS WITH HYPERGLYCEMIA, WITH LONG-TERM CURRENT USE OF INSULIN: ICD-10-CM

## 2025-07-08 DIAGNOSIS — E11.65 TYPE 2 DIABETES MELLITUS WITH HYPERGLYCEMIA, WITH LONG-TERM CURRENT USE OF INSULIN: ICD-10-CM

## 2025-07-08 RX ORDER — BLOOD-GLUCOSE METER
1 KIT MISCELLANEOUS
Qty: 400 EACH | Refills: 3 | Status: SHIPPED | OUTPATIENT
Start: 2025-07-08

## 2025-07-08 RX ORDER — LANCETS 28 GAUGE
1 EACH MISCELLANEOUS
Qty: 400 EACH | Refills: 3 | Status: SHIPPED | OUTPATIENT
Start: 2025-07-08

## 2025-08-15 ENCOUNTER — TREATMENT (OUTPATIENT)
Dept: PHYSICAL THERAPY | Facility: CLINIC | Age: 65
End: 2025-08-15
Payer: MEDICARE

## 2025-08-15 DIAGNOSIS — M79.641 RIGHT HAND PAIN: ICD-10-CM

## 2025-08-15 DIAGNOSIS — M65.331 TRIGGER FINGER, RIGHT MIDDLE FINGER: Primary | ICD-10-CM

## 2025-08-15 PROCEDURE — 97165 OT EVAL LOW COMPLEX 30 MIN: CPT | Performed by: PHYSICAL THERAPIST

## 2025-08-15 PROCEDURE — 97535 SELF CARE MNGMENT TRAINING: CPT | Performed by: PHYSICAL THERAPIST

## 2025-08-15 PROCEDURE — 97110 THERAPEUTIC EXERCISES: CPT | Performed by: PHYSICAL THERAPIST

## 2025-08-18 DIAGNOSIS — G47.09 OTHER INSOMNIA: ICD-10-CM

## 2025-08-18 DIAGNOSIS — I10 HTN (HYPERTENSION), BENIGN: ICD-10-CM

## 2025-08-18 DIAGNOSIS — E78.1 HYPERTRIGLYCERIDEMIA: ICD-10-CM

## 2025-08-18 DIAGNOSIS — M65.331 TRIGGER MIDDLE FINGER OF RIGHT HAND: Primary | ICD-10-CM

## 2025-08-18 RX ORDER — AMLODIPINE BESYLATE 5 MG/1
5 TABLET ORAL DAILY
Qty: 90 TABLET | Refills: 0 | Status: SHIPPED | OUTPATIENT
Start: 2025-08-18

## 2025-08-18 RX ORDER — FENOFIBRATE 145 MG/1
145 TABLET, FILM COATED ORAL DAILY
Qty: 90 TABLET | Refills: 0 | Status: SHIPPED | OUTPATIENT
Start: 2025-08-18

## 2025-08-18 RX ORDER — TRAZODONE HYDROCHLORIDE 100 MG/1
100 TABLET ORAL
Qty: 90 TABLET | Refills: 0 | Status: SHIPPED | OUTPATIENT
Start: 2025-08-18

## 2025-08-19 ENCOUNTER — TREATMENT (OUTPATIENT)
Dept: PHYSICAL THERAPY | Facility: CLINIC | Age: 65
End: 2025-08-19
Payer: MEDICARE

## 2025-08-19 DIAGNOSIS — M79.641 RIGHT HAND PAIN: ICD-10-CM

## 2025-08-19 DIAGNOSIS — M65.331 TRIGGER FINGER, RIGHT MIDDLE FINGER: Primary | ICD-10-CM

## 2025-08-19 PROCEDURE — 97112 NEUROMUSCULAR REEDUCATION: CPT | Performed by: PHYSICAL THERAPIST

## 2025-08-19 PROCEDURE — 97110 THERAPEUTIC EXERCISES: CPT | Performed by: PHYSICAL THERAPIST
